# Patient Record
Sex: MALE | Race: WHITE | NOT HISPANIC OR LATINO | Employment: UNEMPLOYED | URBAN - METROPOLITAN AREA
[De-identification: names, ages, dates, MRNs, and addresses within clinical notes are randomized per-mention and may not be internally consistent; named-entity substitution may affect disease eponyms.]

---

## 2017-04-30 ENCOUNTER — APPOINTMENT (OUTPATIENT)
Dept: RADIOLOGY | Facility: MEDICAL CENTER | Age: 47
End: 2017-04-30
Attending: EMERGENCY MEDICINE

## 2017-04-30 ENCOUNTER — APPOINTMENT (OUTPATIENT)
Dept: RADIOLOGY | Facility: MEDICAL CENTER | Age: 47
End: 2017-04-30
Attending: EMERGENCY MEDICINE
Payer: MEDICAID

## 2017-04-30 ENCOUNTER — HOSPITAL ENCOUNTER (EMERGENCY)
Facility: MEDICAL CENTER | Age: 47
End: 2017-04-30
Attending: EMERGENCY MEDICINE
Payer: MEDICAID

## 2017-04-30 VITALS
TEMPERATURE: 97.9 F | RESPIRATION RATE: 16 BRPM | WEIGHT: 254.19 LBS | DIASTOLIC BLOOD PRESSURE: 97 MMHG | BODY MASS INDEX: 33.69 KG/M2 | SYSTOLIC BLOOD PRESSURE: 141 MMHG | OXYGEN SATURATION: 99 % | HEART RATE: 85 BPM | HEIGHT: 73 IN

## 2017-04-30 DIAGNOSIS — I15.9 SECONDARY HYPERTENSION: ICD-10-CM
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
## 2017-04-30 DIAGNOSIS — M54.2 NECK PAIN: ICD-10-CM

## 2017-04-30 DIAGNOSIS — S43.401A SPRAIN OF RIGHT SHOULDER, UNSPECIFIED SHOULDER SPRAIN TYPE, INITIAL ENCOUNTER: ICD-10-CM

## 2017-04-30 DIAGNOSIS — R73.9 HYPERGLYCEMIA: ICD-10-CM

## 2017-04-30 LAB — GLUCOSE BLD-MCNC: 133 MG/DL (ref 65–99)

## 2017-04-30 PROCEDURE — 71010 DX-CHEST-LIMITED (1 VIEW): CPT

## 2017-04-30 PROCEDURE — 72125 CT NECK SPINE W/O DYE: CPT

## 2017-04-30 PROCEDURE — 99284 EMERGENCY DEPT VISIT MOD MDM: CPT

## 2017-04-30 PROCEDURE — 82962 GLUCOSE BLOOD TEST: CPT

## 2017-04-30 PROCEDURE — 700102 HCHG RX REV CODE 250 W/ 637 OVERRIDE(OP): Performed by: EMERGENCY MEDICINE

## 2017-04-30 PROCEDURE — A9270 NON-COVERED ITEM OR SERVICE: HCPCS | Performed by: EMERGENCY MEDICINE

## 2017-04-30 PROCEDURE — 73030 X-RAY EXAM OF SHOULDER: CPT | Mod: RT

## 2017-04-30 RX ORDER — HYDROCODONE BITARTRATE AND ACETAMINOPHEN 5; 325 MG/1; MG/1
1-2 TABLET ORAL EVERY 4 HOURS PRN
Qty: 10 TAB | Refills: 0 | Status: SHIPPED | OUTPATIENT
Start: 2017-04-30 | End: 2018-12-27

## 2017-04-30 RX ORDER — CYCLOBENZAPRINE HCL 10 MG
10 TABLET ORAL 3 TIMES DAILY PRN
Qty: 10 TAB | Refills: 0 | Status: SHIPPED | OUTPATIENT
Start: 2017-04-30 | End: 2018-12-27

## 2017-04-30 RX ORDER — HYDROCODONE BITARTRATE AND ACETAMINOPHEN 5; 325 MG/1; MG/1
1 TABLET ORAL ONCE
Status: COMPLETED | OUTPATIENT
Start: 2017-04-30 | End: 2017-04-30

## 2017-04-30 RX ADMIN — HYDROCODONE BITARTRATE AND ACETAMINOPHEN 1 TABLET: 5; 325 TABLET ORAL at 09:28

## 2017-04-30 ASSESSMENT — PAIN SCALES - GENERAL
PAINLEVEL_OUTOF10: 9
PAINLEVEL_OUTOF10: 4

## 2017-04-30 NOTE — ED AVS SNAPSHOT
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 12/15/2001 Document Revised: 03/11/2013 Document Reviewed: 12/29/2012  [employer]® Patient Information ©2014 [employer].    You have been given medication which is a narcotic.  You should not drive anything and not drink any alchol or take any other medications without first discussing them with your physician.    Soft Tissue Injury of the Neck  A soft tissue injury of the neck may be either blunt or penetrating. A blunt injury does not break the skin. A penetrating injury breaks the skin, creating an open wound. Blunt injuries may happen in several ways. Most involve some type of direct blow to the neck. This can cause serious injury to the windpipe, voice box, cervical spine, or esophagus. In some cases, the injury to the soft tissue can also result in a break (fracture) of the cervical spine.   Soft tissue injuries of the neck require immediate medical care. Sometimes, you may not notice the signs of injury right away. You may feel fine at first, but the swelling may eventually close off your airway. This could result in a significant or life-threatening injury. This is rare, but it is important to keep in mind with any injury to the neck.   CAUSES   Causes of blunt injury may include:  · \"Clothesline\" injuries. This happens when someone is moving at high speed and runs into a " clothesline, outstretched arm, or similar object. This results in a direct injury to the front of the neck. If the airway is blocked, it can cause suffocation due to lack of oxygen (asphyxiation) or even instant death.  · High-energy trauma. This includes injuries from motor vehicle crashes, falling from a great height, or heavy objects falling onto the neck.  · Sports-related injuries. Injury to the windpipe and voice box can result from being struck by another player or being struck by an object, such as a baseball, hockey stick, or an outstretched arm.  · Strangulation. This type of injury may cause skin trauma, hoarseness of voice, or broken cartilage in the voice box or windpipe. It may also cause a serious airway problem.  SYMPTOMS   · Bruising.  · Pain and tenderness in the neck.  · Swelling of the neck and face.  · Hoarseness of voice.  · Pain or difficulty with swallowing.  · Drooling or inability to swallow.  · Trouble breathing. This may become worse when lying flat.  · Coughing up blood.  · High-pitched, harsh, vibratory noise due to partial obstruction of the windpipe (stridor).  · Swelling of the upper arms.  · Windpipe that appears to be pushed off to one side.  · Air in the tissues under the skin of the neck or chest (subcutaneous emphysema). This usually indicates a problem with the normal airway and is a medical emergency.  DIAGNOSIS   · If possible, your caregiver may ask about the details of how the injury occurred. A detailed exam can help to identify specific areas of the neck that are injured.  · Your caregiver may ask for tests to rule out injury of the voice box, airway, or esophagus. This may include X-rays, ultrasounds, CT scans, or MRI scans, depending on the severity of your injury.  TREATMENT   If you have an injury to your windpipe or voice box, immediate medical care is required. In almost all cases, hospitalization is necessary. For injuries that do not appear to require surgery, it  is helpful to have medical observation for 24 hours. You may be asked to do one or more of the following:  · Rest your voice.  · Bed rest.  · Limit your diet, depending on the extent of the injury. Follow your caregiver's dietary guidelines. Often, only fluids and soft foods are recommended.  · Keep your head raised.  · Breathe humidified air.  · Take medicines to control infection, reduce swelling, and reduce normal stomach acid. You may also need pain medicine, depending on your injury.  For injuries that appear to require surgery, you will need to stay in the hospital. The exact type of procedure needed will depend on your exact injury or injuries.   HOME CARE INSTRUCTIONS   · If the skin was broken, keep the wound area clean and dry. Wear your bandage (dressing) and care for your wound as instructed.  · Follow your caregiver's advice about your diet.  · Follow your caregiver's advice about use of your voice.  · Take medicines as directed.  · Keep your head and neck at least partially raised (elevated) while recovering. This should also be done while sleeping.  SEEK MEDICAL CARE IF:   · Your voice becomes weaker.  · Your swelling or bruising is not improving as expected. Typically, this takes several days to improve.  · You feel that you are having problems with medicines prescribed.  · You have drainage from the injury site. This may be a sign that your wound is not healing properly or is infected.  · You develop increasing pain or difficulty while swallowing.  · You develop an oral temperature of 102° F (38.9° C) or higher.  SEEK IMMEDIATE MEDICAL CARE IF:   · You cough up blood.  · You develop sudden trouble breathing.  · You cannot tolerate your oral medicines, or you are unable to swallow.  · You develop drooling.  · You have new or worsening vomiting.  · You develop sudden, new swelling of the neck or face.  · You have an oral temperature above 102° F (38.9° C), not controlled by medicine.  MAKE SURE  YOU:  · Understand these instructions.  · Will watch your condition.  · Will get help right away if you are not doing well or get worse.     This information is not intended to replace advice given to you by your health care provider. Make sure you discuss any questions you have with your health care provider.     Document Released: 03/26/2009 Document Revised: 03/11/2013 Document Reviewed: 03/05/2012  [employer] Patient Education ©2016 [employer]            Patient Information     Patient Information    Following emergency treatment: all patient requiring follow-up care must return either to a private physician or a clinic if your condition worsens before you are able to obtain further medical attention, please return to the emergency room.     Billing Information    At [facility], we work to make the billing process streamlined for our patients.  Our Representatives are here to answer any questions you may have regarding your hospital bill.  If you have insurance coverage and have supplied your insurance information to us, we will submit a claim to your insurer on your behalf.  Should you have any questions regarding your bill, we can be reached online or by phone as follows:  Online: You are able pay your bills online or live chat with our representatives about any billing questions you may have. We are here to help Monday - Friday from 8:00am to 7:30pm and 9:00am - 12:00pm on Saturdays.  Please visit [email or web address]  for more information.   Phone:  [phone] or [phone]    Please note that your emergency physician, surgeon, pathologist, radiologist, anesthesiologist, and other specialists are not employed by [facility] and will therefore bill separately for their services.  Please contact them directly for any questions concerning their bills at the numbers below:     Emergency Physician Services:  [phone]  [facility] [employer] [facility]:   [phone]  [facility]:  [phone]  [facility]:  [phone]    1. Your final bill may vary from the amount quoted upon discharge if all procedures are not complete at that time, or if your doctor has additional procedures of which we are not aware. You will receive an additional bill if you return to the Emergency Department at [facility] for suture removal regardless of the facility of which the sutures were placed.     2. Please arrange for settlement of this account at the emergency registration.    3. All self-pay accounts are due in full at the time of treatment.  If you are unable to meet this obligation then payment is expected within 4-5 days.     4. If you have had radiology studies (CT, X-ray, Ultrasound, MRI), you have received a preliminary result during your emergency department visit. Please contact the radiology department [phone] to receive a copy of your final result. Please discuss the Final result with your primary physician or with the follow up physician provided.     Crisis Hotline:  [place] Crisis Hotline:  5-496-BVUUUGQ or 1-139.538.5381  [place] Crisis Hotline:    1-899.793.7992 or 388-614-5743         ED Discharge Follow Up Questions    1. In order to provide you with very good care, we would like to follow up with a phone call in the next few days.  May we have your permission to contact you?     YES /  NO    2. What is the best phone number to call you? (       )_____-__________    3. What is the best time to call you?      Morning  /  Afternoon  /  Evening                   Patient Signature:  ____________________________________________________________    Date:  ____________________________________________________________
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
## 2017-04-30 NOTE — ED AVS SNAPSHOT
CopaCast Access Code: [email or web address]  Expires: 5/30/2017 10:03 AM    CopaCast  A secure, online tool to manage your health information     [employer]’s CopaCast® is a secure, online tool that connects you to your personalized health information from the privacy of your home -- day or night - making it very easy for you to manage your healthcare. Once the activation process is completed, you can even access your medical information using the CopaCast saroj, which is available for free in the Apple Saroj store or Google Play store.     CopaCast provides the following levels of access (as shown below):   My Chart Features   [facility] Primary Care Doctor [facility]  Specialists [facility]  Urgent  Care Non-[facility]  Primary Care  Doctor   Email your healthcare team securely and privately 24/7 X X X X   Manage appointments: schedule your next appointment; view details of past/upcoming appointments X      Request prescription refills. X      View recent personal medical records, including lab and immunizations X X X X   View health record, including health history, allergies, medications X X X X   Read reports about your outpatient visits, procedures, consult and ER notes X X X X   See your discharge summary, which is a recap of your hospital and/or ER visit that includes your diagnosis, lab results, and care plan. X X       How to register for CopaCast:  1. Go to  [email or web address].  2. Click on the Sign Up Now box, which takes you to the New Member Sign Up page. You will need to provide the following information:  a. Enter your CopaCast Access Code exactly as it appears at the top of this page. (You will not need to use this code after you’ve completed the sign-up process. If you do not sign up before the expiration date, you must request a new code.)   b. Enter your date of birth.   c. Enter your home email address.   d. Click Submit, and follow the next screen’s instructions.  3. Create a CopaCast ID. This will be your CopaCast  login ID and cannot be changed, so think of one that is secure and easy to remember.  4. Create a [employer] password. You can change your password at any time.  5. Enter your Password Reset Question and Answer. This can be used at a later time if you forget your password.   6. Enter your e-mail address. This allows you to receive e-mail notifications when new information is available in [employer].  7. Click Sign Up. You can now view your health information.    For assistance activating your [employer] account, call [phone]
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
## 2017-04-30 NOTE — ED AVS SNAPSHOT
4/30/2017    Anatoliy Paniagua  [address] [address]    Dear Anatoliy:    [facility] wants to ensure your discharge home is safe and you or your loved ones have had all of your questions answered regarding your care after you leave the hospital.    Below is a list of resources and contact information should you have any questions regarding your hospital stay, follow-up instructions, or active medical symptoms.    Questions or Concerns Regarding… Contact   Medical Questions Related to Your Discharge  (7 days a week, 8am-5pm) Contact a Nurse Care Coordinator   [phone]   Medical Questions Not Related to Your Discharge  (24 hours a day / 7 days a week)  Contact the Nurse Health Line   [phone]    Medications or Discharge Instructions Refer to your discharge packet   or contact your [facility] Primary Care Provider   [phone]   Follow-up Appointment(s) Schedule your appointment via Million-2-1   or contact Scheduling [phone]   Billing Review your statement via Million-[employer]-1  or contact Billing [phone]   Medical Records Review your records via Million-2-1   or contact Medical Records [phone]     You may receive a telephone call within two days of discharge. This call is to make certain you understand your discharge instructions and have the opportunity to have any questions answered. You can also easily access your medical information, test results and upcoming appointments via the Million-2-1 free online health management tool. You can learn more and sign up at [email or web address]. For assistance setting up your Million-2-1 account, please call [phone].    Once again, we want to ensure your discharge home is safe and that you have a clear understanding of any next steps in your care. If you have any questions or concerns, please do not hesitate to contact us, we are here for you. Thank you for choosing [facility] for your healthcare needs.    Sincerely,    Your [facility] Healthcare Team

## 2017-04-30 NOTE — DISCHARGE INSTRUCTIONS
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 the neck or chest (subcutaneous emphysema). This usually indicates a problem with the normal airway and is a medical emergency.  DIAGNOSIS   · If possible, your caregiver may ask about the details of how the injury occurred. A detailed exam can help to identify specific areas of the neck that are injured.  · Your caregiver may ask for tests to rule out injury of the voice box, airway, or esophagus. This may include X-rays, ultrasounds, CT scans, or MRI scans, depending on the severity of your injury.  TREATMENT   If you have an injury to your windpipe or voice box, immediate medical care is required. In almost all cases, hospitalization is necessary. For injuries that do not appear to require surgery, it is helpful to have medical observation for 24 hours. You may be asked to do one or more of the following:  · Rest your voice.  · Bed rest.  · Limit your diet, depending on the extent of the injury. Follow your caregiver's dietary guidelines. Often, only fluids and soft foods are recommended.  · Keep your head raised.  · Breathe humidified air.  · Take medicines to control infection, reduce swelling, and reduce normal stomach acid. You may also need pain medicine, depending on your injury.  For injuries that appear to require surgery, you will need to stay in the hospital. The exact type of procedure needed will depend on your exact injury or injuries.   HOME CARE INSTRUCTIONS   · If the skin was  broken, keep the wound area clean and dry. Wear your bandage (dressing) and care for your wound as instructed.  · Follow your caregiver's advice about your diet.  · Follow your caregiver's advice about use of your voice.  · Take medicines as directed.  · Keep your head and neck at least partially raised (elevated) while recovering. This should also be done while sleeping.  SEEK MEDICAL CARE IF:   · Your voice becomes weaker.  · Your swelling or bruising is not improving as expected. Typically, this takes several days to improve.  · You feel that you are having problems with medicines prescribed.  · You have drainage from the injury site. This may be a sign that your wound is not healing properly or is infected.  · You develop increasing pain or difficulty while swallowing.  · You develop an oral temperature of 102° F (38.9° C) or higher.  SEEK IMMEDIATE MEDICAL CARE IF:   · You cough up blood.  · You develop sudden trouble breathing.  · You cannot tolerate your oral medicines, or you are unable to swallow.  · You develop drooling.  · You have new or worsening vomiting.  · You develop sudden, new swelling of the neck or face.  · You have an oral temperature above 102° F (38.9° C), not controlled by medicine.  MAKE SURE YOU:  · Understand these instructions.  · Will watch your condition.  · Will get help right away if you are not doing well or get worse.     This information is not intended to replace advice given to you by your health care provider. Make sure you discuss any questions you have with your health care provider.     Document Released: 03/26/2009 Document Revised: 03/11/2013 Document Reviewed: 03/05/2012  [employer] Interactive Patient Education ©2016 [employer]
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
## 2017-05-02 ENCOUNTER — HOSPITAL ENCOUNTER (EMERGENCY)
Dept: HOSPITAL 8 - ED | Age: 47
LOS: 1 days | Discharge: HOME | End: 2017-05-03
Payer: MEDICAID

## 2017-05-02 VITALS — HEIGHT: 73 IN | WEIGHT: 225.53 LBS | BODY MASS INDEX: 29.89 KG/M2

## 2017-05-02 VITALS — SYSTOLIC BLOOD PRESSURE: 150 MMHG | DIASTOLIC BLOOD PRESSURE: 82 MMHG

## 2017-05-02 DIAGNOSIS — S06.0X0A: Primary | ICD-10-CM
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
## 2017-05-02 DIAGNOSIS — S16.1XXA: ICD-10-CM

## 2017-05-02 DIAGNOSIS — I10: ICD-10-CM
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
## 2017-05-02 PROCEDURE — 93005 ELECTROCARDIOGRAM TRACING: CPT

## 2017-05-02 PROCEDURE — 99284 EMERGENCY DEPT VISIT MOD MDM: CPT

## 2017-05-02 PROCEDURE — 70450 CT HEAD/BRAIN W/O DYE: CPT

## 2017-05-02 PROCEDURE — 72125 CT NECK SPINE W/O DYE: CPT

## 2018-01-28 ENCOUNTER — APPOINTMENT (OUTPATIENT)
Dept: RADIOLOGY | Facility: MEDICAL CENTER | Age: 48
End: 2018-01-28
Attending: EMERGENCY MEDICINE
Payer: COMMERCIAL

## 2018-01-28 ENCOUNTER — HOSPITAL ENCOUNTER (EMERGENCY)
Facility: MEDICAL CENTER | Age: 48
End: 2018-01-28
Attending: EMERGENCY MEDICINE
Payer: COMMERCIAL

## 2018-01-28 VITALS
WEIGHT: 246.91 LBS | OXYGEN SATURATION: 99 % | BODY MASS INDEX: 32.58 KG/M2 | HEART RATE: 74 BPM | DIASTOLIC BLOOD PRESSURE: 74 MMHG | SYSTOLIC BLOOD PRESSURE: 135 MMHG | TEMPERATURE: 97.7 F | RESPIRATION RATE: 18 BRPM

## 2018-01-28 DIAGNOSIS — M25.511 ACUTE PAIN OF RIGHT SHOULDER: ICD-10-CM

## 2018-01-28 LAB
ANION GAP SERPL CALC-SCNC: 10 MMOL/L (ref 0–11.9)
BASOPHILS # BLD AUTO: 0.6 % (ref 0–1.8)
BASOPHILS # BLD: 0.07 K/UL (ref 0–0.12)
BUN SERPL-MCNC: 16 MG/DL (ref 8–22)
CALCIUM SERPL-MCNC: 9.7 MG/DL (ref 8.5–10.5)
CHLORIDE SERPL-SCNC: 106 MMOL/L (ref 96–112)
CO2 SERPL-SCNC: 23 MMOL/L (ref 20–33)
CREAT SERPL-MCNC: 0.99 MG/DL (ref 0.5–1.4)
CRP SERPL HS-MCNC: 0.93 MG/DL (ref 0–0.75)
EOSINOPHIL # BLD AUTO: 0.15 K/UL (ref 0–0.51)
EOSINOPHIL NFR BLD: 1.2 % (ref 0–6.9)
ERYTHROCYTE [DISTWIDTH] IN BLOOD BY AUTOMATED COUNT: 41.1 FL (ref 35.9–50)
GLUCOSE SERPL-MCNC: 102 MG/DL (ref 65–99)
HCT VFR BLD AUTO: 43.2 % (ref 42–52)
HGB BLD-MCNC: 14.9 G/DL (ref 14–18)
IMM GRANULOCYTES # BLD AUTO: 0.04 K/UL (ref 0–0.11)
IMM GRANULOCYTES NFR BLD AUTO: 0.3 % (ref 0–0.9)
LYMPHOCYTES # BLD AUTO: 2.21 K/UL (ref 1–4.8)
LYMPHOCYTES NFR BLD: 18.3 % (ref 22–41)
MCH RBC QN AUTO: 31.7 PG (ref 27–33)
MCHC RBC AUTO-ENTMCNC: 34.5 G/DL (ref 33.7–35.3)
MCV RBC AUTO: 91.9 FL (ref 81.4–97.8)
MONOCYTES # BLD AUTO: 0.93 K/UL (ref 0–0.85)
MONOCYTES NFR BLD AUTO: 7.7 % (ref 0–13.4)
NEUTROPHILS # BLD AUTO: 8.69 K/UL (ref 1.82–7.42)
NEUTROPHILS NFR BLD: 71.9 % (ref 44–72)
NRBC # BLD AUTO: 0 K/UL
NRBC BLD-RTO: 0 /100 WBC
PLATELET # BLD AUTO: 285 K/UL (ref 164–446)
PMV BLD AUTO: 9.1 FL (ref 9–12.9)
POTASSIUM SERPL-SCNC: 3.9 MMOL/L (ref 3.6–5.5)
RBC # BLD AUTO: 4.7 M/UL (ref 4.7–6.1)
SODIUM SERPL-SCNC: 139 MMOL/L (ref 135–145)
WBC # BLD AUTO: 12.1 K/UL (ref 4.8–10.8)

## 2018-01-28 PROCEDURE — 99284 EMERGENCY DEPT VISIT MOD MDM: CPT

## 2018-01-28 PROCEDURE — 85025 COMPLETE CBC W/AUTO DIFF WBC: CPT

## 2018-01-28 PROCEDURE — 700111 HCHG RX REV CODE 636 W/ 250 OVERRIDE (IP): Performed by: EMERGENCY MEDICINE

## 2018-01-28 PROCEDURE — 80048 BASIC METABOLIC PNL TOTAL CA: CPT

## 2018-01-28 PROCEDURE — 86140 C-REACTIVE PROTEIN: CPT

## 2018-01-28 PROCEDURE — 73030 X-RAY EXAM OF SHOULDER: CPT | Mod: RT

## 2018-01-28 PROCEDURE — 93971 EXTREMITY STUDY: CPT

## 2018-01-28 PROCEDURE — 96374 THER/PROPH/DIAG INJ IV PUSH: CPT

## 2018-01-28 PROCEDURE — 700101 HCHG RX REV CODE 250: Performed by: EMERGENCY MEDICINE

## 2018-01-28 RX ORDER — LIDOCAINE HYDROCHLORIDE 10 MG/ML
10 INJECTION, SOLUTION INFILTRATION; PERINEURAL ONCE
Status: COMPLETED | OUTPATIENT
Start: 2018-01-28 | End: 2018-01-28

## 2018-01-28 RX ORDER — KETOROLAC TROMETHAMINE 30 MG/ML
INJECTION, SOLUTION INTRAMUSCULAR; INTRAVENOUS
Status: DISCONTINUED
Start: 2018-01-28 | End: 2018-01-28 | Stop reason: HOSPADM

## 2018-01-28 RX ORDER — KETOROLAC TROMETHAMINE 30 MG/ML
30 INJECTION, SOLUTION INTRAMUSCULAR; INTRAVENOUS ONCE
Status: COMPLETED | OUTPATIENT
Start: 2018-01-28 | End: 2018-01-28

## 2018-01-28 RX ORDER — NEBIVOLOL 20 MG/1
TABLET ORAL
COMMUNITY
End: 2018-12-27

## 2018-01-28 RX ADMIN — KETOROLAC TROMETHAMINE 30 MG: 30 INJECTION, SOLUTION INTRAMUSCULAR at 10:42

## 2018-01-28 RX ADMIN — LIDOCAINE HYDROCHLORIDE 10 ML: 10 INJECTION, SOLUTION INFILTRATION; PERINEURAL at 09:45

## 2018-01-28 ASSESSMENT — PAIN SCALES - GENERAL
PAINLEVEL_OUTOF10: 8
PAINLEVEL_OUTOF10: 8

## 2018-01-28 NOTE — ED PROVIDER NOTES
ED Provider Note    CHIEF COMPLAINT  Chief Complaint   Patient presents with   • Arm Pain     right       HPI  Anatoliy Paniagua is a 47 y.o. male presents with right shoulder pain. Started a few days ago. Gradually worsening. Worse when he moves the arm. He localizes pain over the right trapezius and anterior right shoulder region. Denies trauma. Denies repetitive activity. Has not had a fever or rash. Initially denied history of injection drug use, but did shoot methamphetamine a couple months ago.    Says he's had off and on lower extremity swelling as well. This is all improved at this time. He had some right hip pain a few days ago but no hip pain now. No back pain. He denies any weakness numbness neurologic symptoms.    REVIEW OF SYSTEMS  As per HPI, otherwise a 10 point review of systems is negative    PAST MEDICAL HISTORY  Past Medical History:   Diagnosis Date   • Hypertension    • Psychiatric disorder     mood   • Stroke (CMS-[facility])        SOCIAL HISTORY  Social History   Substance Use Topics   • Smoking status: Current Every Day Smoker     Packs/day: 0.50     Types: Cigarettes   • Smokeless tobacco: Never Used      Comment: 1/2ppd; 20years   • Alcohol use Yes      Comment: socially       SURGICAL HISTORY  History reviewed. No pertinent surgical history.    CURRENT MEDICATIONS  Home Medications    **Home medications have not yet been reviewed for this encounter**         ALLERGIES  No Known Allergies    PHYSICAL EXAM  VITAL SIGNS: /96   Pulse 75   Temp 36 °C (96.8 °F)   Resp 17   Wt 112 kg (246 lb 14.6 oz)   SpO2 99%   BMI 32.58 kg/m²    Constitutional: Awake and alert, anxious  HENT:  Atraumatic, few lipomas over the scalp.Oropharynx moist mucus membranes, Nose normal inspection.   Eyes: Normal inspection  Neck: Supple  Cardiovascular: Normal heart rate, Normal rhythm.  Symmetric peripheral pulses. No murmur  Thorax & Lungs: No respiratory distress, No wheezing, No rales, No rhonchi, No chest  tenderness.   Abdomen: Bowel sounds normal, soft, non-distended, nontender, no mass  Skin: Warm, Dry, No rash.   Back: No tenderness, No CVA tenderness.   Extremities: No clubbing, cyanosis, edema, no Homans or cords   Neurologic: Awake alert oriented can move all extremities      RADIOLOGY/PROCEDURES  LE VENOUS DUPLEX (Specify in Comments Left, Right Or Bilateral)   Preliminary result report no DVT       DX-SHOULDER 2+ RIGHT   Final Result      There is no evidence of acute fracture.   Mild osteoarthritic changes are noted at the AC joint.      UE VENOUS DUPLEX    (Results Pending)      Imaging is interpreted by radiologist        Arthrocentesis Procedure Note    Indication: Joint pain    Consent: The patient was counseled regarding the procedure, it's indications, risks, potential complications and alternatives and any questions were answered. Consent was obtained.    Procedure: The right shoulder was positioned appropriately and the landmarks were identified.  Local anesthesia was obtained by infiltration using 1% Lidocaine without epinephrine.  The area was then prepped and draped in the usual sterile fashion.  A needle was then introduced into the joint space at which point No fluid was able to be aspirated.  A joint injection was not performed.    The patient tolerated the procedure well.    Complications: None          Labs:  Results for orders placed or performed during the hospital encounter of 01/28/18   CBC WITH DIFFERENTIAL   Result Value Ref Range    WBC 12.1 (H) 4.8 - 10.8 K/uL    RBC 4.70 4.70 - 6.10 M/uL    Hemoglobin 14.9 14.0 - 18.0 g/dL    Hematocrit 43.2 42.0 - 52.0 %    MCV 91.9 81.4 - 97.8 fL    MCH 31.7 27.0 - 33.0 pg    MCHC 34.5 33.7 - 35.3 g/dL    RDW 41.1 35.9 - 50.0 fL    Platelet Count 285 164 - 446 K/uL    MPV 9.1 9.0 - 12.9 fL    Neutrophils-Polys 71.90 44.00 - 72.00 %    Lymphocytes 18.30 (L) 22.00 - 41.00 %    Monocytes 7.70 0.00 - 13.40 %    Eosinophils 1.20 0.00 - 6.90 %     Basophils 0.60 0.00 - 1.80 %    Immature Granulocytes 0.30 0.00 - 0.90 %    Nucleated RBC 0.00 /100 WBC    Neutrophils (Absolute) 8.69 (H) 1.82 - 7.42 K/uL    Lymphs (Absolute) 2.21 1.00 - 4.80 K/uL    Monos (Absolute) 0.93 (H) 0.00 - 0.85 K/uL    Eos (Absolute) 0.15 0.00 - 0.51 K/uL    Baso (Absolute) 0.07 0.00 - 0.12 K/uL    Immature Granulocytes (abs) 0.04 0.00 - 0.11 K/uL    NRBC (Absolute) 0.00 K/uL   BASIC METABOLIC PANEL   Result Value Ref Range    Sodium 139 135 - 145 mmol/L    Potassium 3.9 3.6 - 5.5 mmol/L    Chloride 106 96 - 112 mmol/L    Co2 23 20 - 33 mmol/L    Glucose 102 (H) 65 - 99 mg/dL    Bun 16 8 - 22 mg/dL    Creatinine 0.99 0.50 - 1.40 mg/dL    Calcium 9.7 8.5 - 10.5 mg/dL    Anion Gap 10.0 0.0 - 11.9   CRP QUANTITIVE (NON-CARDIAC)   Result Value Ref Range    Stat C-Reactive Protein 0.93 (H) 0.00 - 0.75 mg/dL   ESTIMATED GFR   Result Value Ref Range    GFR If African American >60 >60 mL/min/1.73 m 2    GFR If Non African American >60 >60 mL/min/1.73 m 2           COURSE & MEDICAL DECISION MAKING  Patient presents with atraumatic right shoulder pain. He has pain with range of motion of the right shoulder. No obvious effusion although is quite muscular. There may be slight asymmetry. He does not have a fever. No overlying skin rash. He does have a history of injection drug use. X-ray of the right shoulder was negative. I obtained laboratory data that demonstrated a very minimally elevated CRP and WBC count. I subsequently attempted joint arthrocentesis. Fairly confident that I entered the joint space and there was no fluid to be aspirated. The patient was given Toradol IV and felt improved. His vital signs were normal.    I consulted Dr. Aguilar. I discussed the presentation of atraumatic shoulder pain without obvious effusion in the face of injection drug use with minimally elevated WBC count and CRP. I cannot rule out intra-articular infection at this time, but does not appear there is a large  effusion. He stated the patient could follow-up closely for repeat evaluation and additional diagnostics as needed. The patient can be seen at the [place] [facility] tomorrow. This seems completely reasonable. I've advised NSAIDs for now. Advised rest and ice. He should return to the ER for any fevers, skin rash or concern.    FINAL IMPRESSION  1. Right shoulder arthralgia      This dictation was created using voice recognition software. The accuracy of the dictation is limited to the abilities of the software.  The nursing notes were reviewed and certain aspects of this information were incorporated into this note.      Electronically signed by: Jacobo Sotelo, 1/28/2018 7:51 AM

## 2018-01-28 NOTE — ED TRIAGE NOTES
"Chief Complaint   Patient presents with   • Arm Pain     right     Pt amb to triage with right arm pain x2 days. Pt reports \"things going down hill' past 3 months. Pt tired in triage. Blood pressure 141/96, pulse 75, temperature 36 °C (96.8 °F), resp. rate 17, weight 112 kg (246 lb 14.6 oz), SpO2 99 %.      "

## 2018-01-28 NOTE — DISCHARGE INSTRUCTIONS
Shoulder Pain  The shoulder is the joint that connects your arms to your body. The bones that form the shoulder joint include the upper arm bone (humerus), the shoulder blade (scapula), and the collarbone (clavicle). The top of the humerus is shaped like a ball and fits into a rather flat socket on the scapula (glenoid cavity). A combination of muscles and strong, fibrous tissues that connect muscles to bones (tendons) support your shoulder joint and hold the ball in the socket. Small, fluid-filled sacs (bursae) are located in different areas of the joint. They act as cushions between the bones and the overlying soft tissues and help reduce friction between the gliding tendons and the bone as you move your arm. Your shoulder joint allows a wide range of motion in your arm. This range of motion allows you to do things like scratch your back or throw a ball. However, this range of motion also makes your shoulder more prone to pain from overuse and injury.  Causes of shoulder pain can originate from both injury and overuse and usually can be grouped in the following four categories:  · Redness, swelling, and pain (inflammation) of the tendon (tendinitis) or the bursae (bursitis).  · Instability, such as a dislocation of the joint.  · Inflammation of the joint (arthritis).  · Broken bone (fracture).  HOME CARE INSTRUCTIONS   · Apply ice to the sore area.  ¨ Put ice in a plastic bag.  ¨ Place a towel between your skin and the bag.  ¨ Leave the ice on for 15-20 minutes, 3-4 times per day for the first 2 days, or as directed by your health care provider.  · Stop using cold packs if they do not help with the pain.  · If you have a shoulder sling or immobilizer, wear it as long as your caregiver instructs. Only remove it to shower or bathe. Move your arm as little as possible, but keep your hand moving to prevent swelling.  · Squeeze a soft ball or foam pad as much as possible to help prevent swelling.  · Only take  over-the-counter or prescription medicines for pain, discomfort, or fever as directed by your caregiver.  SEEK MEDICAL CARE IF:   · Your shoulder pain increases, or new pain develops in your arm, hand, or fingers.  · Your hand or fingers become cold and numb.  · Your pain is not relieved with medicines.  SEEK IMMEDIATE MEDICAL CARE IF:   · Your arm, hand, or fingers are numb or tingling.  · Your arm, hand, or fingers are significantly swollen or turn white or blue.  MAKE SURE YOU:   · Understand these instructions.  · Will watch your condition.  · Will get help right away if you are not doing well or get worse.     This information is not intended to replace advice given to you by your health care provider. Make sure you discuss any questions you have with your health care provider.     Document Released: 09/27/2006 Document Revised: 01/08/2016 Document Reviewed: 04/11/2016  [employer] Interactive Patient Education ©2016 [employer]

## 2018-04-15 ENCOUNTER — HOSPITAL ENCOUNTER (INPATIENT)
Dept: HOSPITAL 8 - ED | Age: 48
LOS: 2 days | Discharge: LEFT BEFORE DISCHARGE | DRG: 311 | End: 2018-04-17
Attending: HOSPITALIST | Admitting: HOSPITALIST
Payer: MEDICAID

## 2018-04-15 VITALS — SYSTOLIC BLOOD PRESSURE: 136 MMHG | DIASTOLIC BLOOD PRESSURE: 82 MMHG

## 2018-04-15 VITALS — DIASTOLIC BLOOD PRESSURE: 84 MMHG | SYSTOLIC BLOOD PRESSURE: 128 MMHG

## 2018-04-15 VITALS — WEIGHT: 253.53 LBS | HEIGHT: 73 IN | BODY MASS INDEX: 33.6 KG/M2

## 2018-04-15 VITALS — DIASTOLIC BLOOD PRESSURE: 82 MMHG | SYSTOLIC BLOOD PRESSURE: 126 MMHG

## 2018-04-15 VITALS — SYSTOLIC BLOOD PRESSURE: 112 MMHG | DIASTOLIC BLOOD PRESSURE: 75 MMHG

## 2018-04-15 VITALS — SYSTOLIC BLOOD PRESSURE: 169 MMHG | DIASTOLIC BLOOD PRESSURE: 90 MMHG

## 2018-04-15 DIAGNOSIS — Z82.49: ICD-10-CM

## 2018-04-15 DIAGNOSIS — T40.605A: ICD-10-CM

## 2018-04-15 DIAGNOSIS — I11.0: ICD-10-CM

## 2018-04-15 DIAGNOSIS — Z87.891: ICD-10-CM

## 2018-04-15 DIAGNOSIS — G93.40: ICD-10-CM

## 2018-04-15 DIAGNOSIS — Z79.82: ICD-10-CM

## 2018-04-15 DIAGNOSIS — Z53.21: ICD-10-CM
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
## 2018-04-15 DIAGNOSIS — T40.7X5A: ICD-10-CM

## 2018-04-15 DIAGNOSIS — F15.129: ICD-10-CM

## 2018-04-15 DIAGNOSIS — I50.30: ICD-10-CM

## 2018-04-15 DIAGNOSIS — E78.5: ICD-10-CM

## 2018-04-15 DIAGNOSIS — I20.9: Primary | ICD-10-CM

## 2018-04-15 LAB
ALBUMIN SERPL-MCNC: 3.9 G/DL (ref 3.4–5)
ANION GAP SERPL CALC-SCNC: 9 MMOL/L (ref 5–15)
BASOPHILS # BLD AUTO: 0.05 X10^3/UL (ref 0–0.1)
BASOPHILS # BLD AUTO: 0.05 X10^3/UL (ref 0–0.1)
BASOPHILS NFR BLD AUTO: 0 % (ref 0–1)
BASOPHILS NFR BLD AUTO: 1 % (ref 0–1)
CALCIUM SERPL-MCNC: 8.7 MG/DL (ref 8.5–10.1)
CHLORIDE SERPL-SCNC: 106 MMOL/L (ref 98–107)
CREAT SERPL-MCNC: 1.26 MG/DL (ref 0.7–1.3)
CULTURE INDICATED?: NO
EOSINOPHIL # BLD AUTO: 0.24 X10^3/UL (ref 0–0.4)
EOSINOPHIL # BLD AUTO: 0.32 X10^3/UL (ref 0–0.4)
EOSINOPHIL NFR BLD AUTO: 2 % (ref 1–7)
EOSINOPHIL NFR BLD AUTO: 3 % (ref 1–7)
ERYTHROCYTE [DISTWIDTH] IN BLOOD BY AUTOMATED COUNT: 12.9 % (ref 9.4–14.8)
ERYTHROCYTE [DISTWIDTH] IN BLOOD BY AUTOMATED COUNT: 13.2 % (ref 9.4–14.8)
LYMPHOCYTES # BLD AUTO: 2.33 X10^3/UL (ref 1–3.4)
LYMPHOCYTES # BLD AUTO: 2.64 X10^3/UL (ref 1–3.4)
LYMPHOCYTES NFR BLD AUTO: 17 % (ref 22–44)
LYMPHOCYTES NFR BLD AUTO: 26 % (ref 22–44)
MCH RBC QN AUTO: 30.2 PG (ref 27.5–34.5)
MCH RBC QN AUTO: 30.4 PG (ref 27.5–34.5)
MCHC RBC AUTO-ENTMCNC: 33.6 G/DL (ref 33.2–36.2)
MCHC RBC AUTO-ENTMCNC: 33.6 G/DL (ref 33.2–36.2)
MCV RBC AUTO: 89.8 FL (ref 81–97)
MCV RBC AUTO: 90.6 FL (ref 81–97)
MD: NO
MD: NO
MICROSCOPIC: (no result)
MONOCYTES # BLD AUTO: 0.73 X10^3/UL (ref 0.2–0.8)
MONOCYTES # BLD AUTO: 0.78 X10^3/UL (ref 0.2–0.8)
MONOCYTES NFR BLD AUTO: 6 % (ref 2–9)
MONOCYTES NFR BLD AUTO: 7 % (ref 2–9)
NEUTROPHILS # BLD AUTO: 10.14 X10^3/UL (ref 1.8–6.8)
NEUTROPHILS # BLD AUTO: 6.29 X10^3/UL (ref 1.8–6.8)
NEUTROPHILS NFR BLD AUTO: 63 % (ref 42–75)
NEUTROPHILS NFR BLD AUTO: 75 % (ref 42–75)
PLATELET # BLD AUTO: 255 X10^3/UL (ref 130–400)
PLATELET # BLD AUTO: 270 X10^3/UL (ref 130–400)
PMV BLD AUTO: 7.4 FL (ref 7.4–10.4)
PMV BLD AUTO: 7.5 FL (ref 7.4–10.4)
RBC # BLD AUTO: 5.35 X10^6/UL (ref 4.38–5.82)
RBC # BLD AUTO: 5.41 X10^6/UL (ref 4.38–5.82)
T4 FREE SERPL-MCNC: 0.98 NG/DL (ref 0.76–1.46)
TROPONIN I SERPL-MCNC: < 0.015 NG/ML (ref 0–0.04)
TROPONIN I SERPL-MCNC: < 0.015 NG/ML (ref 0–0.04)
TSH SERPL-ACNC: 2.52 MIU/L (ref 0.36–3.74)

## 2018-04-15 PROCEDURE — 83735 ASSAY OF MAGNESIUM: CPT

## 2018-04-15 PROCEDURE — A9502 TC99M TETROFOSMIN: HCPCS

## 2018-04-15 PROCEDURE — 84439 ASSAY OF FREE THYROXINE: CPT

## 2018-04-15 PROCEDURE — C9898 INPNT STAY RADIOLABELED ITEM: HCPCS

## 2018-04-15 PROCEDURE — 99285 EMERGENCY DEPT VISIT HI MDM: CPT

## 2018-04-15 PROCEDURE — 93306 TTE W/DOPPLER COMPLETE: CPT

## 2018-04-15 PROCEDURE — 82040 ASSAY OF SERUM ALBUMIN: CPT

## 2018-04-15 PROCEDURE — 78452 HT MUSCLE IMAGE SPECT MULT: CPT

## 2018-04-15 PROCEDURE — 93017 CV STRESS TEST TRACING ONLY: CPT

## 2018-04-15 PROCEDURE — 71045 X-RAY EXAM CHEST 1 VIEW: CPT

## 2018-04-15 PROCEDURE — 80307 DRUG TEST PRSMV CHEM ANLYZR: CPT

## 2018-04-15 PROCEDURE — 84100 ASSAY OF PHOSPHORUS: CPT

## 2018-04-15 PROCEDURE — 71275 CT ANGIOGRAPHY CHEST: CPT

## 2018-04-15 PROCEDURE — 80048 BASIC METABOLIC PNL TOTAL CA: CPT

## 2018-04-15 PROCEDURE — 80053 COMPREHEN METABOLIC PANEL: CPT

## 2018-04-15 PROCEDURE — 93005 ELECTROCARDIOGRAM TRACING: CPT

## 2018-04-15 PROCEDURE — 84484 ASSAY OF TROPONIN QUANT: CPT

## 2018-04-15 PROCEDURE — 84443 ASSAY THYROID STIM HORMONE: CPT

## 2018-04-15 PROCEDURE — 93880 EXTRACRANIAL BILAT STUDY: CPT

## 2018-04-15 PROCEDURE — 81003 URINALYSIS AUTO W/O SCOPE: CPT

## 2018-04-15 PROCEDURE — 80061 LIPID PANEL: CPT

## 2018-04-15 PROCEDURE — 36415 COLL VENOUS BLD VENIPUNCTURE: CPT

## 2018-04-15 PROCEDURE — 82962 GLUCOSE BLOOD TEST: CPT

## 2018-04-15 PROCEDURE — 85025 COMPLETE CBC W/AUTO DIFF WBC: CPT

## 2018-04-15 RX ADMIN — ENOXAPARIN SODIUM SCH MG: 40 INJECTION SUBCUTANEOUS at 12:41

## 2018-04-15 RX ADMIN — ASPIRIN SCH MG: 81 TABLET, COATED ORAL at 10:00

## 2018-04-15 RX ADMIN — PANTOPRAZOLE SODIUM SCH MG: 40 TABLET, DELAYED RELEASE ORAL at 12:40

## 2018-04-15 RX ADMIN — LISINOPRIL SCH MG: 20 TABLET ORAL at 12:41

## 2018-04-16 VITALS — DIASTOLIC BLOOD PRESSURE: 94 MMHG | SYSTOLIC BLOOD PRESSURE: 147 MMHG

## 2018-04-16 VITALS — DIASTOLIC BLOOD PRESSURE: 83 MMHG | SYSTOLIC BLOOD PRESSURE: 125 MMHG

## 2018-04-16 VITALS — DIASTOLIC BLOOD PRESSURE: 94 MMHG | SYSTOLIC BLOOD PRESSURE: 167 MMHG

## 2018-04-16 VITALS — SYSTOLIC BLOOD PRESSURE: 142 MMHG | DIASTOLIC BLOOD PRESSURE: 95 MMHG

## 2018-04-16 VITALS — DIASTOLIC BLOOD PRESSURE: 93 MMHG | SYSTOLIC BLOOD PRESSURE: 138 MMHG

## 2018-04-16 VITALS — DIASTOLIC BLOOD PRESSURE: 84 MMHG | SYSTOLIC BLOOD PRESSURE: 121 MMHG

## 2018-04-16 LAB
ALBUMIN SERPL-MCNC: 3.4 G/DL (ref 3.4–5)
ALP SERPL-CCNC: 75 U/L (ref 45–117)
ALT SERPL-CCNC: 59 U/L (ref 12–78)
ANION GAP SERPL CALC-SCNC: 6 MMOL/L (ref 5–15)
BILIRUB SERPL-MCNC: 0.2 MG/DL (ref 0.2–1)
CALCIUM SERPL-MCNC: 8.5 MG/DL (ref 8.5–10.1)
CHLORIDE SERPL-SCNC: 107 MMOL/L (ref 98–107)
CHOL/HDL RATIO: 6.7
CREAT SERPL-MCNC: 1.3 MG/DL (ref 0.7–1.3)
HDL CHOL %: 15 % (ref 26–37)
HDL CHOLESTEROL (DIRECT): 32 MG/DL (ref 40–60)
LDL CHOLESTEROL,CALCULATED: 134 MG/DL (ref 54–169)
LDLC/HDLC SERPL: 4.2 {RATIO} (ref 0.5–3)
PROT SERPL-MCNC: 7.4 G/DL (ref 6.4–8.2)
TRIGL SERPL-MCNC: 233 MG/DL (ref 50–200)
TROPONIN I SERPL-MCNC: < 0.015 NG/ML (ref 0–0.04)
VLDLC SERPL CALC-MCNC: 47 MG/DL (ref 0–25)

## 2018-04-16 RX ADMIN — ASPIRIN SCH MG: 81 TABLET, COATED ORAL at 08:26

## 2018-04-16 RX ADMIN — NICOTINE POLACRILEX PRN MG: 2 GUM, CHEWING ORAL at 08:30

## 2018-04-16 RX ADMIN — PANTOPRAZOLE SODIUM SCH MG: 40 TABLET, DELAYED RELEASE ORAL at 08:26

## 2018-04-16 RX ADMIN — LISINOPRIL SCH MG: 20 TABLET ORAL at 08:26

## 2018-04-16 RX ADMIN — ENOXAPARIN SODIUM SCH MG: 40 INJECTION SUBCUTANEOUS at 08:26

## 2018-04-17 VITALS — SYSTOLIC BLOOD PRESSURE: 145 MMHG | DIASTOLIC BLOOD PRESSURE: 85 MMHG

## 2018-04-17 VITALS — DIASTOLIC BLOOD PRESSURE: 83 MMHG | SYSTOLIC BLOOD PRESSURE: 128 MMHG

## 2018-04-17 VITALS — SYSTOLIC BLOOD PRESSURE: 131 MMHG | DIASTOLIC BLOOD PRESSURE: 81 MMHG

## 2018-04-17 VITALS — SYSTOLIC BLOOD PRESSURE: 162 MMHG | DIASTOLIC BLOOD PRESSURE: 92 MMHG

## 2018-04-17 VITALS — DIASTOLIC BLOOD PRESSURE: 105 MMHG | SYSTOLIC BLOOD PRESSURE: 172 MMHG

## 2018-04-17 VITALS — SYSTOLIC BLOOD PRESSURE: 122 MMHG | DIASTOLIC BLOOD PRESSURE: 85 MMHG

## 2018-04-17 VITALS — SYSTOLIC BLOOD PRESSURE: 170 MMHG | DIASTOLIC BLOOD PRESSURE: 103 MMHG

## 2018-04-17 VITALS — SYSTOLIC BLOOD PRESSURE: 150 MMHG | DIASTOLIC BLOOD PRESSURE: 70 MMHG

## 2018-04-17 VITALS — DIASTOLIC BLOOD PRESSURE: 68 MMHG | SYSTOLIC BLOOD PRESSURE: 135 MMHG

## 2018-04-17 VITALS — DIASTOLIC BLOOD PRESSURE: 83 MMHG | SYSTOLIC BLOOD PRESSURE: 122 MMHG

## 2018-04-17 RX ADMIN — DILTIAZEM HYDROCHLORIDE SCH MG: 60 TABLET, FILM COATED ORAL at 15:37

## 2018-04-17 RX ADMIN — ASPIRIN SCH MG: 81 TABLET, COATED ORAL at 07:52

## 2018-04-17 RX ADMIN — DILTIAZEM HYDROCHLORIDE SCH MG: 60 TABLET, FILM COATED ORAL at 10:36

## 2018-04-17 RX ADMIN — NICOTINE POLACRILEX PRN MG: 2 GUM, CHEWING ORAL at 10:39

## 2018-04-17 RX ADMIN — PANTOPRAZOLE SODIUM SCH MG: 40 TABLET, DELAYED RELEASE ORAL at 07:52

## 2018-04-17 RX ADMIN — ENOXAPARIN SODIUM SCH MG: 40 INJECTION SUBCUTANEOUS at 07:52

## 2018-04-17 RX ADMIN — LISINOPRIL SCH MG: 20 TABLET ORAL at 07:52

## 2018-12-27 ENCOUNTER — PATIENT OUTREACH (OUTPATIENT)
Dept: HEALTH INFORMATION MANAGEMENT | Facility: OTHER | Age: 48
End: 2018-12-27

## 2018-12-27 ENCOUNTER — HOSPITAL ENCOUNTER (EMERGENCY)
Facility: MEDICAL CENTER | Age: 48
End: 2018-12-27
Attending: EMERGENCY MEDICINE
Payer: COMMERCIAL

## 2018-12-27 VITALS
BODY MASS INDEX: 34.39 KG/M2 | SYSTOLIC BLOOD PRESSURE: 155 MMHG | TEMPERATURE: 97.5 F | OXYGEN SATURATION: 97 % | DIASTOLIC BLOOD PRESSURE: 106 MMHG | HEIGHT: 73 IN | HEART RATE: 68 BPM | RESPIRATION RATE: 20 BRPM | WEIGHT: 259.48 LBS

## 2018-12-27 DIAGNOSIS — G89.29 CHRONIC NONINTRACTABLE HEADACHE, UNSPECIFIED HEADACHE TYPE: ICD-10-CM

## 2018-12-27 DIAGNOSIS — I10 HYPERTENSION, UNSPECIFIED TYPE: ICD-10-CM

## 2018-12-27 DIAGNOSIS — R51.9 CHRONIC NONINTRACTABLE HEADACHE, UNSPECIFIED HEADACHE TYPE: ICD-10-CM

## 2018-12-27 LAB
ANION GAP SERPL CALC-SCNC: 10 MMOL/L (ref 0–11.9)
BASOPHILS # BLD AUTO: 0.9 % (ref 0–1.8)
BASOPHILS # BLD: 0.08 K/UL (ref 0–0.12)
BUN SERPL-MCNC: 21 MG/DL (ref 8–22)
CALCIUM SERPL-MCNC: 10.1 MG/DL (ref 8.5–10.5)
CHLORIDE SERPL-SCNC: 105 MMOL/L (ref 96–112)
CO2 SERPL-SCNC: 24 MMOL/L (ref 20–33)
CREAT SERPL-MCNC: 1.13 MG/DL (ref 0.5–1.4)
EKG IMPRESSION: NORMAL
EOSINOPHIL # BLD AUTO: 0.22 K/UL (ref 0–0.51)
EOSINOPHIL NFR BLD: 2.4 % (ref 0–6.9)
ERYTHROCYTE [DISTWIDTH] IN BLOOD BY AUTOMATED COUNT: 38.5 FL (ref 35.9–50)
GLUCOSE SERPL-MCNC: 96 MG/DL (ref 65–99)
HCT VFR BLD AUTO: 49.3 % (ref 42–52)
HGB BLD-MCNC: 16.8 G/DL (ref 14–18)
IMM GRANULOCYTES # BLD AUTO: 0.04 K/UL (ref 0–0.11)
IMM GRANULOCYTES NFR BLD AUTO: 0.4 % (ref 0–0.9)
LYMPHOCYTES # BLD AUTO: 2.47 K/UL (ref 1–4.8)
LYMPHOCYTES NFR BLD: 26.7 % (ref 22–41)
MCH RBC QN AUTO: 30.7 PG (ref 27–33)
MCHC RBC AUTO-ENTMCNC: 34.1 G/DL (ref 33.7–35.3)
MCV RBC AUTO: 90 FL (ref 81.4–97.8)
MONOCYTES # BLD AUTO: 0.54 K/UL (ref 0–0.85)
MONOCYTES NFR BLD AUTO: 5.8 % (ref 0–13.4)
NEUTROPHILS # BLD AUTO: 5.91 K/UL (ref 1.82–7.42)
NEUTROPHILS NFR BLD: 63.8 % (ref 44–72)
NRBC # BLD AUTO: 0 K/UL
NRBC BLD-RTO: 0 /100 WBC
PLATELET # BLD AUTO: 290 K/UL (ref 164–446)
PMV BLD AUTO: 9 FL (ref 9–12.9)
POTASSIUM SERPL-SCNC: 3.9 MMOL/L (ref 3.6–5.5)
RBC # BLD AUTO: 5.48 M/UL (ref 4.7–6.1)
SODIUM SERPL-SCNC: 139 MMOL/L (ref 135–145)
WBC # BLD AUTO: 9.3 K/UL (ref 4.8–10.8)

## 2018-12-27 PROCEDURE — 96374 THER/PROPH/DIAG INJ IV PUSH: CPT

## 2018-12-27 PROCEDURE — 96375 TX/PRO/DX INJ NEW DRUG ADDON: CPT

## 2018-12-27 PROCEDURE — 85025 COMPLETE CBC W/AUTO DIFF WBC: CPT

## 2018-12-27 PROCEDURE — 93005 ELECTROCARDIOGRAM TRACING: CPT | Performed by: EMERGENCY MEDICINE

## 2018-12-27 PROCEDURE — 99284 EMERGENCY DEPT VISIT MOD MDM: CPT

## 2018-12-27 PROCEDURE — 80048 BASIC METABOLIC PNL TOTAL CA: CPT

## 2018-12-27 PROCEDURE — 700111 HCHG RX REV CODE 636 W/ 250 OVERRIDE (IP): Performed by: EMERGENCY MEDICINE

## 2018-12-27 PROCEDURE — A9270 NON-COVERED ITEM OR SERVICE: HCPCS | Performed by: EMERGENCY MEDICINE

## 2018-12-27 PROCEDURE — 700102 HCHG RX REV CODE 250 W/ 637 OVERRIDE(OP): Performed by: EMERGENCY MEDICINE

## 2018-12-27 PROCEDURE — 700105 HCHG RX REV CODE 258: Performed by: EMERGENCY MEDICINE

## 2018-12-27 RX ORDER — SODIUM CHLORIDE 9 MG/ML
1000 INJECTION, SOLUTION INTRAVENOUS ONCE
Status: COMPLETED | OUTPATIENT
Start: 2018-12-27 | End: 2018-12-27

## 2018-12-27 RX ORDER — KETOROLAC TROMETHAMINE 30 MG/ML
30 INJECTION, SOLUTION INTRAMUSCULAR; INTRAVENOUS ONCE
Status: COMPLETED | OUTPATIENT
Start: 2018-12-27 | End: 2018-12-27

## 2018-12-27 RX ORDER — LISINOPRIL 10 MG/1
5 TABLET ORAL ONCE
Status: COMPLETED | OUTPATIENT
Start: 2018-12-27 | End: 2018-12-27

## 2018-12-27 RX ORDER — LISINOPRIL 5 MG/1
5 TABLET ORAL DAILY
Qty: 30 TAB | Refills: 0 | Status: SHIPPED | OUTPATIENT
Start: 2018-12-27 | End: 2019-01-17

## 2018-12-27 RX ORDER — DIPHENHYDRAMINE HYDROCHLORIDE 50 MG/ML
50 INJECTION INTRAMUSCULAR; INTRAVENOUS ONCE
Status: COMPLETED | OUTPATIENT
Start: 2018-12-27 | End: 2018-12-27

## 2018-12-27 RX ADMIN — KETOROLAC TROMETHAMINE 30 MG: 30 INJECTION, SOLUTION INTRAMUSCULAR at 15:11

## 2018-12-27 RX ADMIN — PROCHLORPERAZINE EDISYLATE 10 MG: 5 INJECTION INTRAMUSCULAR; INTRAVENOUS at 15:05

## 2018-12-27 RX ADMIN — SODIUM CHLORIDE 1000 ML: 9 INJECTION, SOLUTION INTRAVENOUS at 15:05

## 2018-12-27 RX ADMIN — LISINOPRIL 5 MG: 10 TABLET ORAL at 14:56

## 2018-12-27 RX ADMIN — DIPHENHYDRAMINE HYDROCHLORIDE 50 MG: 50 INJECTION INTRAMUSCULAR; INTRAVENOUS at 15:11

## 2018-12-27 NOTE — ED TRIAGE NOTES
Pt to triage .  Chief Complaint   Patient presents with   • Headache     hst of the same r/t htn but has not been placed on medication

## 2018-12-27 NOTE — ED PROVIDER NOTES
"ED PROVIDER NOTE    Scribed for Zaira Pond M.D. by Tawny Parry. 12/27/2018  2:23 PM    Means of arrival: walk-in  History obtained from: patient  History limited by: none    CHIEF COMPLAINT  Chief Complaint   Patient presents with   • Headache     hst of the same r/t htn but has not been placed on medication        HPI  Anatoliy Paniagua is a 48 y.o. male with past medical hx of hypertension, diabetes and hyperlipidemia who presents today for evaluation of a non radiating headache onset a few days ago, located along the back of his head with associated intermittent nausea(last episode being 2 days ago). This is exacerbated with any sort of movement of the head or light, gradually worsening over the last few days. Patient reports that he woke up today, with his headache being the worse it has been, prompting him to come to the ED. SO accompanying him today, reports that she has been noticing some intermittent facial swelling, describing his face as \"sometimes weird in appearance.\"   Patient reports a history of hypertension, however, is not currently on maintenance medication. He has been on Lisinopril in the past, however, is not currently, discontinuing this once he was released from detention.  He has been evaluated for headaches similar to this in the past, reportedly always being told that this was secondary to his hypertension. This headache is not resolved with Aleve, last dose earlier this morning. Patient has significant family history of hypertension, heart attacks and strokes at a young age. There is family history of diabetes, and patient was found to be borderline diabetic a few years ago with admission, however, he reports he checks his blood sugars intermittently, frequently running in the 90s.  No complaints of vomiting.   Patient is also reporting difficulties along his left eye, that has been present for the last few years. He states that he feels his eyelid half shutting or twitching " "intermittently, sometimes requiring him to open his eye with his fingers to see. These symptoms occur more frequently at night and he also has poor baseline sleeping habits. There is family history of myasthenia gravis in his uncle.    REVIEW OF SYSTEMS  See HPI for further details. All other systems are negative.     PAST MEDICAL HISTORY   has a past medical history of Hypertension; Psychiatric disorder; and Stroke (HCC).    SOCIAL HISTORY  Social History     Social History Main Topics   • Smoking status: Current Every Day Smoker     Packs/day: 0.50     Types: Cigarettes   • Smokeless tobacco: Never Used      Comment: 1/2ppd; 20years   • Alcohol use Yes      Comment: socially   • Drug use: Yes     Types: Inhaled      Comment: I smoke weed       SURGICAL HISTORY  patient denies any surgical history    CURRENT MEDICATIONS  Home Medications     Reviewed by Betty Santizo R.N. (Registered Nurse) on 12/27/18 at 1323  Med List Status: Complete   Medication Last Dose Status        Patient Luciano Taking any Medications                       ALLERGIES  No Known Allergies    PHYSICAL EXAM  VITAL SIGNS: BP (!) 161/110 Comment: Pt states he has High BP, unable to get meds.  Pulse 85   Temp 36.4 °C (97.5 °F) (Temporal)   Resp 20   Ht 1.854 m (6' 1\")   Wt 117.7 kg (259 lb 7.7 oz)   SpO2 96%   BMI 34.23 kg/m²     Pulse ox interpretation: I interpret this pulse ox as normal.  Constitutional: Alert in no apparent distress  HENT: Normocephalic, atraumatic. Bilateral external ears normal, Nose normal. Moist mucous membranes.  Eyes: Pupils are equal and reactive, Conjunctiva normal. EOMI, no nystagmus  Neck: Normal range of motion, Supple, non-tender. No JVD  Lymphatic: No lymphadenopathy noted.   Cardiovascular: normal rate and rhythm, no murmurs. Distal pulses intact.  no peripheral edema.  Thorax & Lungs: normal breath sounds.  no wheezing/rales/rhonchi. no increased work of breathing, clipped speech or retractions.  Abdomen: " Soft, non-distended, non tender to palpation, no CVA tenderness  Skin: Warm, Dry, No erythema, no rash. Mild chronic venous insufficiency changes noted along skin of BLE  Musculoskeletal: Good range of motion in all major joints. No major deformities noted.  Neurologic:  Alert, EOMI, no nystagmus, fluent speech, tongue midline, no facial droop, 5/5 strength BUE and BLE. negative romberg, normal gait, no pronator drift, heal to shin intact, normal finger to nose, Grossly intact neurologic exam.   Psychiatric: Affect normal, Judgment normal, Mood normal.       DIAGNOSTIC STUDIES / PROCEDURES        LABS and EKG  Results for orders placed or performed during the hospital encounter of 12/27/18   CBC WITH DIFFERENTIAL   Result Value Ref Range    WBC 9.3 4.8 - 10.8 K/uL    RBC 5.48 4.70 - 6.10 M/uL    Hemoglobin 16.8 14.0 - 18.0 g/dL    Hematocrit 49.3 42.0 - 52.0 %    MCV 90.0 81.4 - 97.8 fL    MCH 30.7 27.0 - 33.0 pg    MCHC 34.1 33.7 - 35.3 g/dL    RDW 38.5 35.9 - 50.0 fL    Platelet Count 290 164 - 446 K/uL    MPV 9.0 9.0 - 12.9 fL    Neutrophils-Polys 63.80 44.00 - 72.00 %    Lymphocytes 26.70 22.00 - 41.00 %    Monocytes 5.80 0.00 - 13.40 %    Eosinophils 2.40 0.00 - 6.90 %    Basophils 0.90 0.00 - 1.80 %    Immature Granulocytes 0.40 0.00 - 0.90 %    Nucleated RBC 0.00 /100 WBC    Neutrophils (Absolute) 5.91 1.82 - 7.42 K/uL    Lymphs (Absolute) 2.47 1.00 - 4.80 K/uL    Monos (Absolute) 0.54 0.00 - 0.85 K/uL    Eos (Absolute) 0.22 0.00 - 0.51 K/uL    Baso (Absolute) 0.08 0.00 - 0.12 K/uL    Immature Granulocytes (abs) 0.04 0.00 - 0.11 K/uL    NRBC (Absolute) 0.00 K/uL   BASIC METABOLIC PANEL   Result Value Ref Range    Sodium 139 135 - 145 mmol/L    Potassium 3.9 3.6 - 5.5 mmol/L    Chloride 105 96 - 112 mmol/L    Co2 24 20 - 33 mmol/L    Glucose 96 65 - 99 mg/dL    Bun 21 8 - 22 mg/dL    Creatinine 1.13 0.50 - 1.40 mg/dL    Calcium 10.1 8.5 - 10.5 mg/dL    Anion Gap 10.0 0.0 - 11.9   ESTIMATED GFR   Result Value  Ref Range    GFR If African American >60 >60 mL/min/1.73 m 2    GFR If Non African American >60 >60 mL/min/1.73 m 2   EKG (NOW)   Result Value Ref Range    Report       [facility] Emergency Dept.    Test Date:  2018  Pt Name:    MOHSEN OWENS                Department: ER  MRN:        [id number]                      Room:       [facility]  Gender:     Male                         Technician: [id number]  :        1970                   Requested By:ROMAIN REYES  Order #:    [id number]                    Reading MD:    Measurements  Intervals                                Axis  Rate:       75                           P:          10  NV:         204                          QRS:        -91  QRSD:       122                          T:          37  QT:         416  QTc:        465    Interpretive Statements  SINUS RHYTHM  BORDERLINE AV CONDUCTION DELAY  RBBB AND LAFB  Compared to ECG 2012 18:09:15  Incomplete right bundle-branch block no longer present  Myocardial infarct finding no longer present  Accelerated junctional rhythm no longer present           COURSE & MEDICAL DECISION MAKING  Nursing notes and vital signs were reviewed. (See chart for details)  The patients records were reviewed showing hx of hypertension, borderline diabetes, hyperlipidemia, history was obtained from the patient.    2:23 PM - Patient seen and examined at bedside. He presents hypertensive, not currently on medications for this, for evaluation of worsening headache, onset over the last few days. Discussed plan of care, including managing his symptoms here in the ED with medications and evaluating for acute processes with lab work and EKG. Patient agrees to the plan of care. The patient will be resuscitated with 1L NS IV for headache and medicated with 30mg IV Toradol, 10mg OV Compazine, 50mg I Benadryl, and 5mg Lisinopril tablet. Ordered for CBC, BMP, and EKG to evaluate his symptoms. Patient's neurological  exam is normal at this time, however, he does have history of borderline medical problems including diabetes and hypertension, warranting a complete work up. Differential diagnoses include but not limited to: hypertensive emergency vs urgency, end stage kidney failure, CVA, TIA, IPH, mass, .    4:24 PM Patient's lab work so far is negative. SO was informed of this, as patient was sleeping. Will re-evaluate the headache once medications are completed.    5:00 PM Feels better. Improved BP. Labs neg acute with no evidence of end organ damage. EKG without ischemic changes. Normal mental status. No e/o hypertensive urgency or emergency at this time.     Have low clinical suspicion for acute neurologic process given normal neuro exam. Patient concerned about myasthenia gravis, but given sxs only left eye, unchanged x 2 years, and worse in the morning and improving over course of day: not consistent with myasthenia.    Given improved symptoms with migraine cocktail, improved BP with lisinopril, Rx for new lisinopril dose, to f/u with MD. Called scheduling at [facility], cannot be seen with his [employer] insurance. Patient and girlfriend in room informed that he needs to switch to medicaid or to f/u in [place].     Patient discharged in company of girlfriend in good condition, Rx lisinopril, strict return precautions.    FINAL IMPRESSION  (I10) Hypertension, unspecified type  (R51) Chronic nonintractable headache, unspecified headache type     I, Tawny Parry (Scribe), am scribing for, and in the presence of, Zaira Pond M.D..    Electronically signed by: Tawny Parry (Farhadibe), 12/27/2018    IZaira M.D. personally performed the services described in this documentation, as scribed by Tawny Parry in my presence, and it is both accurate and complete.    The note accurately reflects work and decisions made by me.  Zaira Pond  12/27/2018  5:20 PM    This dictation was created using voice recognition  software. The accuracy of the dictation is limited to the abilities of the software. I expect there may be some errors of grammar and possibly content. The nursing notes were reviewed and certain aspects of this information were incorporated into this note.

## 2019-01-17 ENCOUNTER — HOSPITAL ENCOUNTER (EMERGENCY)
Facility: MEDICAL CENTER | Age: 49
End: 2019-01-17
Attending: EMERGENCY MEDICINE
Payer: COMMERCIAL

## 2019-01-17 VITALS
BODY MASS INDEX: 32.08 KG/M2 | HEART RATE: 80 BPM | OXYGEN SATURATION: 95 % | WEIGHT: 250 LBS | SYSTOLIC BLOOD PRESSURE: 193 MMHG | TEMPERATURE: 98.4 F | HEIGHT: 74 IN | DIASTOLIC BLOOD PRESSURE: 105 MMHG | RESPIRATION RATE: 18 BRPM

## 2019-01-17 DIAGNOSIS — I10 HYPERTENSION, UNSPECIFIED TYPE: ICD-10-CM

## 2019-01-17 DIAGNOSIS — Z00.8 MEDICAL CLEARANCE FOR INCARCERATION: ICD-10-CM

## 2019-01-17 PROCEDURE — 99284 EMERGENCY DEPT VISIT MOD MDM: CPT

## 2019-01-17 PROCEDURE — A9270 NON-COVERED ITEM OR SERVICE: HCPCS | Performed by: EMERGENCY MEDICINE

## 2019-01-17 PROCEDURE — 700102 HCHG RX REV CODE 250 W/ 637 OVERRIDE(OP): Performed by: EMERGENCY MEDICINE

## 2019-01-17 RX ORDER — LISINOPRIL 10 MG/1
5 TABLET ORAL ONCE
Status: COMPLETED | OUTPATIENT
Start: 2019-01-17 | End: 2019-01-17

## 2019-01-17 RX ORDER — LISINOPRIL 20 MG/1
20 TABLET ORAL DAILY
Qty: 30 TAB | Refills: 0 | Status: SHIPPED | OUTPATIENT
Start: 2019-01-17 | End: 2019-02-16

## 2019-01-17 RX ORDER — LABETALOL 100 MG/1
100 TABLET, FILM COATED ORAL ONCE
Status: DISCONTINUED | OUTPATIENT
Start: 2019-01-17 | End: 2019-01-17

## 2019-01-17 RX ORDER — LABETALOL 100 MG/1
100 TABLET, FILM COATED ORAL ONCE
Status: COMPLETED | OUTPATIENT
Start: 2019-01-17 | End: 2019-01-17

## 2019-01-17 RX ADMIN — LABETALOL HYDROCHLORIDE 100 MG: 100 TABLET, FILM COATED ORAL at 00:47

## 2019-01-17 RX ADMIN — LISINOPRIL 5 MG: 10 TABLET ORAL at 00:47

## 2019-01-17 ASSESSMENT — PAIN SCALES - GENERAL: PAINLEVEL_OUTOF10: 5

## 2019-01-17 NOTE — ED PROVIDER NOTES
ED Provider Note    CHIEF COMPLAINT  Chief Complaint   Patient presents with   • Medical Clearance     in custody, noncomplaint with blood pressure medication, sent for medical clearance due to hypertension   • Hypertension     noncompliant with home lisinopril, pt asymptomatic of HTN       HPI  Anatoliy Paniagua is a 48 y.o. male who presents to the emergency department for chief complaint of hypertension.  He is here is for med rinse for alf.  The patient states he supposed be taking lisinopril but is extremely noncompliant with his medications and cannot give me a good reason not for taking them.  He denies any chest pain or shortness of breath any dizziness any nausea or vomiting vision changes any weakness numbness or tingling.  He states earlier his vision was a little blurry but currently it is not.  He does endorse using amphetamines within the last 24 hours.    REVIEW OF SYSTEMS  Positives as above. Pertinent negatives include chest pain shortness of breath leg swelling nausea vomiting weakness numbness tingling speech difficulty  All other review of systems are negative    PAST MEDICAL HISTORY   has a past medical history of Hypertension; Psychiatric disorder; and Stroke (HCC).    SOCIAL HISTORY  Social History     Social History Main Topics   • Smoking status: Current Every Day Smoker     Packs/day: 0.50     Types: Cigarettes   • Smokeless tobacco: Never Used      Comment: 1/2ppd; 20years   • Alcohol use Yes      Comment: socially   • Drug use: Yes     Types: Inhaled      Comment: I smoke weed   • Sexual activity: Not on file       SURGICAL HISTORY  patient denies any surgical history    CURRENT MEDICATIONS  Home Medications     Reviewed by Oneyda Hennessy R.N. (Registered Nurse) on 01/17/19 at 0022  Med List Status: Partial   Medication Last Dose Status   lisinopril (PRINIVIL) 5 MG Tab not taking Active                ALLERGIES  No Known Allergies    PHYSICAL EXAM  VITAL SIGNS: BP (!) 185/113   Pulse  "78   Temp 36.9 °C (98.4 °F) (Temporal)   Resp 18   Ht 1.88 m (6' 2\")   Wt 113.4 kg (250 lb)   SpO2 96%   BMI 32.10 kg/m²    Pulse ox interpretation: I interpret this pulse ox as normal.  Constitutional: Alert in no apparent distress.  HENT: Normocephalic, Atraumatic, MMM  Eyes: PERound. Conjunctiva normal, non-icteric.   Heart: Regular rate and rythm, no murmurs.    Lungs: Clear to auscultation bilaterally. No resp distress, breath sounds equal  Abdomen: Non-tender, non-distended, normal bowel sounds  EXT: no edema, normal peripheral perfusion  Skin: Warm, Dry, No erythema, No rash.   Neurologic: Alert and oriented, Grossly non-focal.       DIFFERENTIAL DIAGNOSIS AND WORK UP PLAN    This is a 48 y.o. male who presents with hypertension amphetamine abuse and medication noncompliance.  The patient is asymptomatic since prior to transfer back to the long term system medically cleared but at this time does not indicate a cardiac nor renal workup.      Pertinent Lab Findings  Labs Reviewed - No data to display    Radiology  No orders to display     The radiologist's interpretation of all radiological studies have been reviewed by me.    COURSE & MEDICAL DECISION MAKING  Pertinent Labs & Imaging studies reviewed. (See chart for details)    1:39 AM  The patient is resting comfortably - pending repeat BP assessment and then transfer    2:09 AM  The patient is resting comfortably and will be discharged to long term with a new prescription for lisinopril and return to the ED for any new or worsening problems     /116  Pulse 80   Temp 36.9 °C (98.4 °F) (Temporal)   Resp 18   Ht 1.88 m (6' 2\")   Wt 113.4 kg (250 lb)   SpO2 95%   BMI 32.10 kg/m²         The patient will return for new or worsening symptoms and is stable at the time of discharge.    The patient is referred to a primary physician for blood pressure management, diabetic screening, and for all other preventative health concerns.    DISPOSITION:  Patient " will be discharged home in stable condition.    FOLLOW UP:  [facility], Emergency Dept  [address] [facility] [id number] [phone]  Schedule an appointment as soon as possible for a visit       [address] [address]  [phone]  Schedule an appointment as soon as possible for a visit   for blood pressure recheck      OUTPATIENT MEDICATIONS:  Current Discharge Medication List      lisinopril 20mg daily         FINAL IMPRESSION  1. Hypertension, unspecified type    2. Medical clearance for incarceration                 Electronically signed by: Malka Lemons, 1/17/2019 12:25 AM    This dictation has been created using voice recognition software and/or scribes. The accuracy of the dictation is limited by the abilities of the software and the expertise of the scribes. I expect there may be some errors of grammar and possibly content. I made every attempt to manually correct the errors within my dictation. However, errors related to voice recognition software and/or scribes may still exist and should be interpreted within the appropriate context.

## 2019-01-17 NOTE — ED NOTES
OK to d/c per MD Cristo, pt endorses feeling improves, continues to deny s/s HTN at this time. Educated r/t prescription, pt d/c'd back into custody and has prescription with police.

## 2019-01-17 NOTE — ED TRIAGE NOTES
Chief Complaint   Patient presents with   • Medical Clearance     in custody, noncomplaint with blood pressure medication, sent for medical clearance due to hypertension   • Hypertension     noncompliant with home lisinopril, pt asymptomatic of HTN     Brought in from [facility]'s office for medical clearance. Hypertensive, asymptomatic and non compliant with lisinopril. retirement requiring medical clearance at this time, pt states he is having some shoulder pain. Denies dizziness, headache, chest pain, shortness of breath. Remains hypertensive. Axo4, PERRLA

## 2025-02-13 ENCOUNTER — APPOINTMENT (OUTPATIENT)
Dept: RADIOLOGY | Facility: MEDICAL CENTER | Age: 55
End: 2025-02-13
Attending: EMERGENCY MEDICINE

## 2025-02-13 ENCOUNTER — HOSPITAL ENCOUNTER (EMERGENCY)
Facility: MEDICAL CENTER | Age: 55
End: 2025-02-13
Attending: EMERGENCY MEDICINE
Payer: COMMERCIAL

## 2025-02-13 VITALS
HEIGHT: 74 IN | OXYGEN SATURATION: 93 % | TEMPERATURE: 96 F | HEART RATE: 69 BPM | WEIGHT: 250 LBS | DIASTOLIC BLOOD PRESSURE: 74 MMHG | BODY MASS INDEX: 32.08 KG/M2 | SYSTOLIC BLOOD PRESSURE: 141 MMHG | RESPIRATION RATE: 18 BRPM

## 2025-02-13 DIAGNOSIS — R10.32 LLQ PAIN: ICD-10-CM

## 2025-02-13 DIAGNOSIS — K59.00 CONSTIPATION, UNSPECIFIED CONSTIPATION TYPE: ICD-10-CM

## 2025-02-13 LAB
ALBUMIN SERPL BCP-MCNC: 4.3 G/DL (ref 3.2–4.9)
ALBUMIN/GLOB SERPL: 1.3 G/DL
ALP SERPL-CCNC: 77 U/L (ref 30–99)
ALT SERPL-CCNC: 20 U/L (ref 2–50)
ANION GAP SERPL CALC-SCNC: 12 MMOL/L (ref 7–16)
AST SERPL-CCNC: 24 U/L (ref 12–45)
BASOPHILS # BLD AUTO: 0.7 % (ref 0–1.8)
BASOPHILS # BLD: 0.06 K/UL (ref 0–0.12)
BILIRUB SERPL-MCNC: 0.5 MG/DL (ref 0.1–1.5)
BUN SERPL-MCNC: 14 MG/DL (ref 8–22)
CALCIUM ALBUM COR SERPL-MCNC: 9.2 MG/DL (ref 8.5–10.5)
CALCIUM SERPL-MCNC: 9.4 MG/DL (ref 8.5–10.5)
CHLORIDE SERPL-SCNC: 107 MMOL/L (ref 96–112)
CO2 SERPL-SCNC: 20 MMOL/L (ref 20–33)
CREAT SERPL-MCNC: 1.16 MG/DL (ref 0.5–1.4)
EOSINOPHIL # BLD AUTO: 0.23 K/UL (ref 0–0.51)
EOSINOPHIL NFR BLD: 2.6 % (ref 0–6.9)
ERYTHROCYTE [DISTWIDTH] IN BLOOD BY AUTOMATED COUNT: 42 FL (ref 35.9–50)
GFR SERPLBLD CREATININE-BSD FMLA CKD-EPI: 74 ML/MIN/1.73 M 2
GLOBULIN SER CALC-MCNC: 3.4 G/DL (ref 1.9–3.5)
GLUCOSE SERPL-MCNC: 103 MG/DL (ref 65–99)
HCT VFR BLD AUTO: 42.7 % (ref 42–52)
HGB BLD-MCNC: 14.5 G/DL (ref 14–18)
IMM GRANULOCYTES # BLD AUTO: 0.02 K/UL (ref 0–0.11)
IMM GRANULOCYTES NFR BLD AUTO: 0.2 % (ref 0–0.9)
LIPASE SERPL-CCNC: 33 U/L (ref 11–82)
LYMPHOCYTES # BLD AUTO: 1.88 K/UL (ref 1–4.8)
LYMPHOCYTES NFR BLD: 21.1 % (ref 22–41)
MCH RBC QN AUTO: 31.2 PG (ref 27–33)
MCHC RBC AUTO-ENTMCNC: 34 G/DL (ref 32.3–36.5)
MCV RBC AUTO: 91.8 FL (ref 81.4–97.8)
MONOCYTES # BLD AUTO: 0.6 K/UL (ref 0–0.85)
MONOCYTES NFR BLD AUTO: 6.7 % (ref 0–13.4)
NEUTROPHILS # BLD AUTO: 6.14 K/UL (ref 1.82–7.42)
NEUTROPHILS NFR BLD: 68.7 % (ref 44–72)
NRBC # BLD AUTO: 0 K/UL
NRBC BLD-RTO: 0 /100 WBC (ref 0–0.2)
PLATELET # BLD AUTO: 343 K/UL (ref 164–446)
PMV BLD AUTO: 9.1 FL (ref 9–12.9)
POTASSIUM SERPL-SCNC: 3.9 MMOL/L (ref 3.6–5.5)
PROT SERPL-MCNC: 7.7 G/DL (ref 6–8.2)
RBC # BLD AUTO: 4.65 M/UL (ref 4.7–6.1)
SODIUM SERPL-SCNC: 139 MMOL/L (ref 135–145)
WBC # BLD AUTO: 8.9 K/UL (ref 4.8–10.8)

## 2025-02-13 PROCEDURE — 36415 COLL VENOUS BLD VENIPUNCTURE: CPT

## 2025-02-13 PROCEDURE — 74177 CT ABD & PELVIS W/CONTRAST: CPT

## 2025-02-13 PROCEDURE — 80053 COMPREHEN METABOLIC PANEL: CPT

## 2025-02-13 PROCEDURE — 83690 ASSAY OF LIPASE: CPT

## 2025-02-13 PROCEDURE — 85025 COMPLETE CBC W/AUTO DIFF WBC: CPT

## 2025-02-13 PROCEDURE — 700117 HCHG RX CONTRAST REV CODE 255: Performed by: EMERGENCY MEDICINE

## 2025-02-13 PROCEDURE — 99284 EMERGENCY DEPT VISIT MOD MDM: CPT

## 2025-02-13 RX ORDER — DOCUSATE SODIUM 100 MG/1
100 CAPSULE, LIQUID FILLED ORAL 2 TIMES DAILY
Qty: 28 CAPSULE | Refills: 0 | Status: SHIPPED | OUTPATIENT
Start: 2025-02-13 | End: 2025-02-27

## 2025-02-13 RX ADMIN — IOHEXOL 100 ML: 350 INJECTION, SOLUTION INTRAVENOUS at 14:57

## 2025-02-13 ASSESSMENT — PAIN DESCRIPTION - PAIN TYPE: TYPE: ACUTE PAIN

## 2025-02-13 NOTE — ED TRIAGE NOTES
"Chief Complaint   Patient presents with    LLQ Pain     Pt reports sharp pains to LLQ and has not had BM in 4 days.  +flatulence       Pt to triage with steady gait for above complaint. Presents with LLQ pain, pt denies BM for 4 days, -n/v. Reports similar instance years ago and was given \"some liquid to drink\" and it worked    Pt back to lobby, educated on triage process and encourage to alert staff of any changes.     Vitals:    02/13/25 1222   BP: (!) 133/91   Pulse: 95   Resp: 16   Temp: (!) 35.6 °C (96 °F)   SpO2: 96%           "

## 2025-02-13 NOTE — ED NOTES
Written and verbal instructions provided to pt. Pt instructed to  medications from pharmacy. Pt instructed to return to emergency department for new or worsening symptoms. Pt verbalized understanding of discharge instructions. Pt ambulatory upon discharge with all belongings.

## 2025-02-13 NOTE — ED PROVIDER NOTES
"ED Provider Note    CHIEF COMPLAINT  Chief Complaint   Patient presents with    LLQ Pain     Pt reports sharp pains to LLQ and has not had BM in 4 days.  +flatulence       EXTERNAL RECORDS REVIEWED  Inpatient Notes patient was admitted at [facility] in April 2018 for angina.  History of hypertension, unspecified diastolic heart failure, and hypertension.    HPI/ROS  LIMITATION TO HISTORY   Select: : None  OUTSIDE HISTORIAN(S):  None    Anatoliy Paniagua is a 54 y.o. male who presents to the ER with complaint of left lower quadrant abdominal pain which occurred 4 days ago.  Since then patient has not had a bowel movement.  He says he has an urge to defecate but when he sits down on the toilet to try to have a bowel movement, he is unable to do so.  He said that he has had some intermittent blood in his stool.  He talked to his primary care physician about that a few months ago and was scheduled to see GI last month, but his Medicaid lapsed and his appointment was canceled.  Patient had a colonoscopy at the age of 42 and said there was some polyps and an ulcer somewhere.  He was not sure if he was supposed to follow-up with a repeat colonoscopy at any point.  Patient said his bowel movements were pretty regular up until the time in which she developed the pain several days ago.  He said he had similar pain and constipation issues when he was in custodial a few years ago.  They gave him some sort of laxative which seem to help.  However, at that time he had quite a bit of abdominal discomfort and \"was really sick with it.\"  He has not had fevers or chills.  No nausea or vomiting.  No back pain.  No testicle pain or swelling.  No pain with urination.  No blood in urine.  No history of kidney stones.    PAST MEDICAL HISTORY   has a past medical history of Hypertension, Psychiatric disorder, and Stroke (HCC).    SURGICAL HISTORY  patient denies any surgical history    FAMILY HISTORY  History reviewed. No pertinent family " "history.    SOCIAL HISTORY  Social History     Tobacco Use    Smoking status: Every Day     Current packs/day: 0.25     Types: Cigarettes    Smokeless tobacco: Never    Tobacco comments:     1/2ppd; 20years   Vaping Use    Vaping status: Never Used   Substance and Sexual Activity    Alcohol use: Not Currently     Comment: socially    Drug use: Yes     Types: Inhaled     Comment: I smoke weed    Sexual activity: Not on file       CURRENT MEDICATIONS  Home Medications       Reviewed by Eve Vera R.N. (Registered Nurse) on 02/13/25 at 1239  Med List Status: Not Addressed     Medication Last Dose Status        Patient Luciano Taking any Medications                           ALLERGIES  No Known Allergies    PHYSICAL EXAM  VITAL SIGNS: BP (!) 141/74   Pulse 69   Temp (!) 35.6 °C (96 °F) (Temporal)   Resp 18   Ht 1.88 m (6' 2\")   Wt 113 kg (250 lb)   SpO2 93%   BMI 32.10 kg/m²    Constitutional:  Well developed, well nourished; No acute distress   HENT: Normocephalic, Atraumatic, Bilateral external ears normal, Oropharynx moist, No erythema or exudates in posterior oropharynx.   Eyes: PERRL, EOMI, Conjunctiva normal, No discharge.   Neck: Normal range of motion, supple, nontender  Lymphatic: No lymphadenopathy noted.   Cardiovascular: Normal heart rate, Normal rhythm, No murmurs, rubs or gallops   Thorax & Lungs: CTA=bilaterally;  No respiratory distress,  No wheezing rales, or rhonchi; No chest tenderness. No crepitus or subQ air  Abdomen: soft, good bowel sounds, no guarding no rebound, no masses, no pulsatile mass, mild inconsistent right upper quadrant and right lower quadrant tenderness, no distention  Skin: Warm, Dry, No erythema, No rash.   Back: No tenderness, No CVA tenderness.   Extremities: 2+ dp and pt pulses bilateral LEs;  Nontender; no pretibial edema  Neurologic: Alert & oriented x 4, clear speech,   Psychiatric: appropriate, normal affect     EKG/LABS  Results for orders placed or performed " during the hospital encounter of 02/13/25   CBC WITH DIFFERENTIAL    Collection Time: 02/13/25 12:44 PM   Result Value Ref Range    WBC 8.9 4.8 - 10.8 K/uL    RBC 4.65 (L) 4.70 - 6.10 M/uL    Hemoglobin 14.5 14.0 - 18.0 g/dL    Hematocrit 42.7 42.0 - 52.0 %    MCV 91.8 81.4 - 97.8 fL    MCH 31.2 27.0 - 33.0 pg    MCHC 34.0 32.3 - 36.5 g/dL    RDW 42.0 35.9 - 50.0 fL    Platelet Count 343 164 - 446 K/uL    MPV 9.1 9.0 - 12.9 fL    Neutrophils-Polys 68.70 44.00 - 72.00 %    Lymphocytes 21.10 (L) 22.00 - 41.00 %    Monocytes 6.70 0.00 - 13.40 %    Eosinophils 2.60 0.00 - 6.90 %    Basophils 0.70 0.00 - 1.80 %    Immature Granulocytes 0.20 0.00 - 0.90 %    Nucleated RBC 0.00 0.00 - 0.20 /100 WBC    Neutrophils (Absolute) 6.14 1.82 - 7.42 K/uL    Lymphs (Absolute) 1.88 1.00 - 4.80 K/uL    Monos (Absolute) 0.60 0.00 - 0.85 K/uL    Eos (Absolute) 0.23 0.00 - 0.51 K/uL    Baso (Absolute) 0.06 0.00 - 0.12 K/uL    Immature Granulocytes (abs) 0.02 0.00 - 0.11 K/uL    NRBC (Absolute) 0.00 K/uL   COMP METABOLIC PANEL    Collection Time: 02/13/25 12:44 PM   Result Value Ref Range    Sodium 139 135 - 145 mmol/L    Potassium 3.9 3.6 - 5.5 mmol/L    Chloride 107 96 - 112 mmol/L    Co2 20 20 - 33 mmol/L    Anion Gap 12.0 7.0 - 16.0    Glucose 103 (H) 65 - 99 mg/dL    Bun 14 8 - 22 mg/dL    Creatinine 1.16 0.50 - 1.40 mg/dL    Calcium 9.4 8.5 - 10.5 mg/dL    Correct Calcium 9.2 8.5 - 10.5 mg/dL    AST(SGOT) 24 12 - 45 U/L    ALT(SGPT) 20 2 - 50 U/L    Alkaline Phosphatase 77 30 - 99 U/L    Total Bilirubin 0.5 0.1 - 1.5 mg/dL    Albumin 4.3 3.2 - 4.9 g/dL    Total Protein 7.7 6.0 - 8.2 g/dL    Globulin 3.4 1.9 - 3.5 g/dL    A-G Ratio 1.3 g/dL   LIPASE    Collection Time: 02/13/25 12:44 PM   Result Value Ref Range    Lipase 33 11 - 82 U/L   ESTIMATED GFR    Collection Time: 02/13/25 12:44 PM   Result Value Ref Range    GFR (CKD-EPI) 74 >60 mL/min/1.73 m 2           RADIOLOGY/PROCEDURES   I have independently interpreted the diagnostic  imaging associated with this visit and am waiting the final reading from the radiologist.     My preliminary interpretation is as follows: ER MD is reviewed the patient's CT scan.  No obvious obstruction.    Radiologist interpretation:  CT-ABDOMEN-PELVIS WITH   Final Result         1. There is no evidence for bowel obstruction, diverticulitis, or appendicitis.   2. No evidence for obstructive uropathy.   3. Small left renal lesion is indeterminate and could be further assessed with ultrasound.   4. Atherosclerosis including coronary artery disease.          COURSE & MEDICAL DECISION MAKING    ASSESSMENT, COURSE AND PLAN  Care Narrative: Patient presents to the ER with complaint of left lower quadrant abdominal pain which occurred 4 days ago.  Pain resolved and he has been pain-free over the last 3 days.  However, he has not had a bowel movement in the last 4 days.  He also has an urge to defecate but is unable to do so.  Patient was supposed to see GI in January but he let his Medicaid lapsed and they canceled his appointment.  He reports having some intermittent blood in his stool over the last 4 months which is why he was supposed to see GI.  His last colonoscopy was at the age of 42.  He describes an urge to defecate but nothing seems to come out.  He is concerned he is constipated.  CT scan does not reveal any significant constipation.  Importantly, there is no evidence of bowel obstruction, diverticulitis, colitis, appendicitis, or any other dangerous intra-abdominal pathology.  He has not had any urinary symptoms.  No testicular pain.  No hematuria.  No evidence for obstructive uropathy.  The patient has no tenderness on abdominal examination.  He is well-appearing.  His vital signs are stable.  At this time I think patient needs to get back in with GI.  Given his tenesmus and the fact that he has had a little bit of intermittent blood in his stool for the last 4 months and was supposed to see GI for  colonoscopy last month, I have imparted to him the importance of getting back in to get his colonoscopy done.  He is not anemic.  He does not describe any blood in his stool today.  At this time he is safe for discharge home.  He has been given strict return precautions and discharge instructions and he understands treatment plan and follow-up.         Patient was told that he has a small indeterminate lesion on his left kidney that needs to be followed up.  He understands the importance of talking to his primary care physician about this incidental finding so that his PMD can order an outpatient ultrasound to further evaluate.    ADDITIONAL PROBLEMS MANAGED  Problem #1: Left lower quadrant abdominal pain 4 days ago which is since resolved.  Problem #2: Constipation x 4 days  Problem #3: Tenesmus    DISPOSITION AND DISCUSSIONS  I have discussed management of the patient with the following physicians and SYD's: None    Discussion of management with other QHP or appropriate source(s): None     Escalation of care considered, and ultimately not performed:IV fluids.  No vomiting.  Patient is not hypotensive or tachycardic.  He is not septic or toxic.  No need for IV fluids.    Barriers to care at this time, including but not limited to:  Patient needs to get reestablished with GI as he forgot to rehab his Medicaid and lost his GI appointment in January. .     Decision tools and prescription drugs considered including, but not limited to: Antibiotics no evidence of diverticulitis or colitis on CT scan.  No need for antibiotics. .    FINAL DIAGNOSIS  1. LLQ pain Acute   2. Constipation, unspecified constipation type Acute        This dictation has been created using voice recognition software. The accuracy of the dictation is limited by the abilities of the software. I expect there may be some errors of grammar and possibly content. I made every attempt to manually correct the errors within my dictation. However, errors  related to voice recognition software may still exist and should be interpreted within the appropriate context.     Electronically signed by: Sia Ann M.D., 2/13/2025 1:38 PM

## 2025-02-13 NOTE — ED NOTES
Rounded on pt. Pt resting comfortably in gurney, unlabored breathing, and states that they have minimal pain, rating it as a 1-2 in the LLQ.

## 2025-02-13 NOTE — DISCHARGE INSTRUCTIONS
Return to the ER for recurrent abdominal pain, worsening constipation, blood in stool, nausea, vomiting, fevers over 100.4, shaking chills, pain with urination, cloudy or foul-smelling urine, blood in urine, or for any worsening.    Please reschedule your colonoscopy as soon as possible.    Follow-up with your primary care physician early this coming week.  If you do not have a primary care doctor, please follow-up with the [facility] clinic early this coming week.    Drink plenty of water to stay well-hydrated.    The radiologist identified a nonspecific lesion on one of your kidneys on CAT scan.  This will need to be followed up by your primary care physician.  Please be sure that you tell your primary care physician about this lesion so that they can order a follow-up ultrasound as recommended by the radiologist.

## 2025-07-09 ENCOUNTER — APPOINTMENT (OUTPATIENT)
Dept: RADIOLOGY | Facility: MEDICAL CENTER | Age: 55
End: 2025-07-09
Attending: EMERGENCY MEDICINE

## 2025-07-09 ENCOUNTER — HOSPITAL ENCOUNTER (EMERGENCY)
Facility: MEDICAL CENTER | Age: 55
End: 2025-07-09
Attending: EMERGENCY MEDICINE

## 2025-07-09 VITALS
BODY MASS INDEX: 31.7 KG/M2 | DIASTOLIC BLOOD PRESSURE: 107 MMHG | RESPIRATION RATE: 18 BRPM | OXYGEN SATURATION: 96 % | HEIGHT: 73 IN | HEART RATE: 71 BPM | TEMPERATURE: 97 F | SYSTOLIC BLOOD PRESSURE: 198 MMHG | WEIGHT: 239.2 LBS

## 2025-07-09 DIAGNOSIS — S06.0X0A CONCUSSION WITHOUT LOSS OF CONSCIOUSNESS, INITIAL ENCOUNTER: ICD-10-CM

## 2025-07-09 DIAGNOSIS — S09.90XA CLOSED HEAD INJURY, INITIAL ENCOUNTER: ICD-10-CM

## 2025-07-09 DIAGNOSIS — M75.102 ROTATOR CUFF SYNDROME OF LEFT SHOULDER: Primary | ICD-10-CM

## 2025-07-09 DIAGNOSIS — I10 ESSENTIAL HYPERTENSION, MALIGNANT: ICD-10-CM

## 2025-07-09 LAB
ALBUMIN SERPL BCP-MCNC: 4.7 G/DL (ref 3.2–4.9)
ALBUMIN/GLOB SERPL: 1.3 G/DL
ALP SERPL-CCNC: 97 U/L (ref 30–99)
ALT SERPL-CCNC: 29 U/L (ref 2–50)
ANION GAP SERPL CALC-SCNC: 13 MMOL/L (ref 7–16)
APTT PPP: 33.4 SEC (ref 24.7–36)
AST SERPL-CCNC: 28 U/L (ref 12–45)
BASOPHILS # BLD AUTO: 0.8 % (ref 0–1.8)
BASOPHILS # BLD: 0.08 K/UL (ref 0–0.12)
BILIRUB SERPL-MCNC: 0.3 MG/DL (ref 0.1–1.5)
BUN SERPL-MCNC: 15 MG/DL (ref 8–22)
CALCIUM ALBUM COR SERPL-MCNC: 9.4 MG/DL (ref 8.5–10.5)
CALCIUM SERPL-MCNC: 10 MG/DL (ref 8.5–10.5)
CHLORIDE SERPL-SCNC: 101 MMOL/L (ref 96–112)
CO2 SERPL-SCNC: 24 MMOL/L (ref 20–33)
CREAT SERPL-MCNC: 1.07 MG/DL (ref 0.5–1.4)
EKG IMPRESSION: NORMAL
EOSINOPHIL # BLD AUTO: 0.24 K/UL (ref 0–0.51)
EOSINOPHIL NFR BLD: 2.4 % (ref 0–6.9)
ERYTHROCYTE [DISTWIDTH] IN BLOOD BY AUTOMATED COUNT: 41.9 FL (ref 35.9–50)
GFR SERPLBLD CREATININE-BSD FMLA CKD-EPI: 81 ML/MIN/1.73 M 2
GLOBULIN SER CALC-MCNC: 3.7 G/DL (ref 1.9–3.5)
GLUCOSE SERPL-MCNC: 103 MG/DL (ref 65–99)
HCT VFR BLD AUTO: 49.7 % (ref 42–52)
HGB BLD-MCNC: 16.5 G/DL (ref 14–18)
IMM GRANULOCYTES # BLD AUTO: 0.06 K/UL (ref 0–0.11)
IMM GRANULOCYTES NFR BLD AUTO: 0.6 % (ref 0–0.9)
INR PPP: 0.87 (ref 0.87–1.13)
LYMPHOCYTES # BLD AUTO: 1.93 K/UL (ref 1–4.8)
LYMPHOCYTES NFR BLD: 19.2 % (ref 22–41)
MCH RBC QN AUTO: 30.4 PG (ref 27–33)
MCHC RBC AUTO-ENTMCNC: 33.2 G/DL (ref 32.3–36.5)
MCV RBC AUTO: 91.7 FL (ref 81.4–97.8)
MONOCYTES # BLD AUTO: 0.53 K/UL (ref 0–0.85)
MONOCYTES NFR BLD AUTO: 5.3 % (ref 0–13.4)
NEUTROPHILS # BLD AUTO: 7.2 K/UL (ref 1.82–7.42)
NEUTROPHILS NFR BLD: 71.7 % (ref 44–72)
NRBC # BLD AUTO: 0 K/UL
NRBC BLD-RTO: 0 /100 WBC (ref 0–0.2)
PLATELET # BLD AUTO: 327 K/UL (ref 164–446)
PMV BLD AUTO: 9.1 FL (ref 9–12.9)
POTASSIUM SERPL-SCNC: 4 MMOL/L (ref 3.6–5.5)
PROT SERPL-MCNC: 8.4 G/DL (ref 6–8.2)
PROTHROMBIN TIME: 11.8 SEC (ref 12–14.6)
RBC # BLD AUTO: 5.42 M/UL (ref 4.7–6.1)
SODIUM SERPL-SCNC: 138 MMOL/L (ref 135–145)
TROPONIN T SERPL-MCNC: 10 NG/L (ref 6–19)
WBC # BLD AUTO: 10 K/UL (ref 4.8–10.8)

## 2025-07-09 PROCEDURE — 85730 THROMBOPLASTIN TIME PARTIAL: CPT

## 2025-07-09 PROCEDURE — 96374 THER/PROPH/DIAG INJ IV PUSH: CPT

## 2025-07-09 PROCEDURE — 71045 X-RAY EXAM CHEST 1 VIEW: CPT

## 2025-07-09 PROCEDURE — 96375 TX/PRO/DX INJ NEW DRUG ADDON: CPT

## 2025-07-09 PROCEDURE — 73030 X-RAY EXAM OF SHOULDER: CPT | Mod: LT

## 2025-07-09 PROCEDURE — 36415 COLL VENOUS BLD VENIPUNCTURE: CPT

## 2025-07-09 PROCEDURE — 700111 HCHG RX REV CODE 636 W/ 250 OVERRIDE (IP): Mod: JZ | Performed by: EMERGENCY MEDICINE

## 2025-07-09 PROCEDURE — 85610 PROTHROMBIN TIME: CPT

## 2025-07-09 PROCEDURE — 84484 ASSAY OF TROPONIN QUANT: CPT

## 2025-07-09 PROCEDURE — 93005 ELECTROCARDIOGRAM TRACING: CPT | Mod: TC | Performed by: EMERGENCY MEDICINE

## 2025-07-09 PROCEDURE — 80053 COMPREHEN METABOLIC PANEL: CPT

## 2025-07-09 PROCEDURE — 85025 COMPLETE CBC W/AUTO DIFF WBC: CPT

## 2025-07-09 PROCEDURE — 70450 CT HEAD/BRAIN W/O DYE: CPT

## 2025-07-09 PROCEDURE — 99284 EMERGENCY DEPT VISIT MOD MDM: CPT

## 2025-07-09 RX ORDER — MORPHINE SULFATE 4 MG/ML
4 INJECTION INTRAVENOUS ONCE
Status: COMPLETED | OUTPATIENT
Start: 2025-07-09 | End: 2025-07-09

## 2025-07-09 RX ORDER — LISINOPRIL 10 MG/1
10 TABLET ORAL 2 TIMES DAILY
Qty: 60 TABLET | Refills: 0 | Status: SHIPPED | OUTPATIENT
Start: 2025-07-09 | End: 2025-08-08

## 2025-07-09 RX ORDER — ONDANSETRON 2 MG/ML
4 INJECTION INTRAMUSCULAR; INTRAVENOUS ONCE
Status: COMPLETED | OUTPATIENT
Start: 2025-07-09 | End: 2025-07-09

## 2025-07-09 RX ORDER — HYDROCODONE BITARTRATE AND ACETAMINOPHEN 5; 325 MG/1; MG/1
1 TABLET ORAL EVERY 6 HOURS PRN
Qty: 11 TABLET | Refills: 0 | Status: SHIPPED | OUTPATIENT
Start: 2025-07-09 | End: 2025-07-12

## 2025-07-09 RX ADMIN — MORPHINE SULFATE 4 MG: 4 INJECTION, SOLUTION INTRAMUSCULAR; INTRAVENOUS at 11:35

## 2025-07-09 RX ADMIN — ONDANSETRON 4 MG: 2 INJECTION INTRAMUSCULAR; INTRAVENOUS at 11:35

## 2025-07-09 ASSESSMENT — FIBROSIS 4 INDEX: FIB4 SCORE: 0.86

## 2025-07-09 ASSESSMENT — PAIN DESCRIPTION - PAIN TYPE: TYPE: ACUTE PAIN

## 2025-07-09 NOTE — ED TRIAGE NOTES
Anatoliy Paniagua  54 y.o. male  Chief Complaint   Patient presents with    Shoulder Pain     Left sided shoulder pain for 1x month. Pt states he was throwing heavy items which initially caused the pain.        Vitals:    07/09/25 0908   BP: (!) 169/109   Pulse: 87   Resp: 16   Temp: 36.1 °C (97 °F)   SpO2: 97%       Patient educated on triage process and encouraged to alert staff of any changes in condition.

## 2025-07-09 NOTE — ED PROVIDER NOTES
ED Provider Note    CHIEF COMPLAINT  Chief Complaint   Patient presents with    Shoulder Pain     Left sided shoulder pain for 1x month. Pt states he was throwing heavy items which initially caused the pain.        EXTERNAL RECORDS REVIEWED  Patient was seen in neighboring facility 3 months prior for acute left shoulder pain told he had moderate osteoarthritis in that shoulder without other acute abnormality.    HPI/ROS  LIMITATION TO HISTORY     OUTSIDE HISTORIAN(S):      Anatoliy Paniagua is a 54 y.o. male who presents to the emergency department with chief complaint of left shoulder pain.  Patient states that he was pulling heavy objects a week ago and felt a tearing sensation in his left shoulder is been having moderate pain in that area since then.  He also reports that a few days ago he had a large TV fall and hit him in the head.  He was knocked out during that time.  He did not seek medical care at that time.  He states that he felt very dizzy nauseous and had severe headache since that time.  He reports the pain from his left shoulder also somewhat comes into his chest.  No abdominal pain no lower extremity pain or swelling no shortness of breath no fevers no chills no other acute symptom change or concern at this time.    PAST MEDICAL HISTORY   has a past medical history of Hypertension, Psychiatric disorder, and Stroke (HCC).    SURGICAL HISTORY  patient denies any surgical history    FAMILY HISTORY  History reviewed. No pertinent family history.    SOCIAL HISTORY  Social History     Tobacco Use    Smoking status: Every Day     Current packs/day: 0.25     Types: Cigarettes    Smokeless tobacco: Never    Tobacco comments:     1/2ppd; 20years   Vaping Use    Vaping status: Never Used   Substance and Sexual Activity    Alcohol use: Not Currently     Comment: socially    Drug use: Yes     Types: Inhaled     Comment: I smoke weed    Sexual activity: Not on file       CURRENT MEDICATIONS  Home Medications   "  **Home medications have not yet been reviewed for this encounter**         ALLERGIES  Allergies[1]    PHYSICAL EXAM  VITAL SIGNS: BP (!) 169/109   Pulse 87   Temp 36.1 °C (97 °F) (Temporal)   Resp 16   Ht 1.854 m (6' 1\")   Wt 109 kg (239 lb 3.2 oz)   SpO2 97%   BMI 31.56 kg/m²      Pulse ox interpretation: I interpret this pulse ox as normal.  Constitutional: Somnolent minimal distress, somewhat difficult to arouse  HEENT: Atraumatic normocephalic, pupils are equal round reactive to light extraocular movements are intact. The nares is clear, external ears are normal, mouth shows moist mucous membranes normal dentition for age  Neck: Supple, no JVD no tracheal deviation  Cardiovascular: Regular rate and rhythm no murmur rub or gallop 2+ pulses peripherally x4  Thorax & Lungs: No respiratory distress, no wheezes rales or rhonchi, No chest tenderness.   GI: Soft nontender nondistended positive bowel sounds, no peritoneal signs  Skin: Warm dry no acute rash or lesion  Musculoskeletal: Patient has tenderness about the anterior and lateral deltoid.  He has pain with internal/external rotation as well as abduction of the left shoulder.  No pain at the elbow normal  strength cap refill and sensation left hand.  Other extremities unremarkable.  Neurologic: Cranial nerves III through XII are grossly intact no sensory deficit no cerebellar dysfunction   Psychiatric: Appropriate affect for situation at this time      EKG/LABS  Results for orders placed or performed during the hospital encounter of 07/09/25   CBC WITH DIFFERENTIAL    Collection Time: 07/09/25 11:34 AM   Result Value Ref Range    WBC 10.0 4.8 - 10.8 K/uL    RBC 5.42 4.70 - 6.10 M/uL    Hemoglobin 16.5 14.0 - 18.0 g/dL    Hematocrit 49.7 42.0 - 52.0 %    MCV 91.7 81.4 - 97.8 fL    MCH 30.4 27.0 - 33.0 pg    MCHC 33.2 32.3 - 36.5 g/dL    RDW 41.9 35.9 - 50.0 fL    Platelet Count 327 164 - 446 K/uL    MPV 9.1 9.0 - 12.9 fL    Neutrophils-Polys 71.70 " 44.00 - 72.00 %    Lymphocytes 19.20 (L) 22.00 - 41.00 %    Monocytes 5.30 0.00 - 13.40 %    Eosinophils 2.40 0.00 - 6.90 %    Basophils 0.80 0.00 - 1.80 %    Immature Granulocytes 0.60 0.00 - 0.90 %    Nucleated RBC 0.00 0.00 - 0.20 /100 WBC    Neutrophils (Absolute) 7.20 1.82 - 7.42 K/uL    Lymphs (Absolute) 1.93 1.00 - 4.80 K/uL    Monos (Absolute) 0.53 0.00 - 0.85 K/uL    Eos (Absolute) 0.24 0.00 - 0.51 K/uL    Baso (Absolute) 0.08 0.00 - 0.12 K/uL    Immature Granulocytes (abs) 0.06 0.00 - 0.11 K/uL    NRBC (Absolute) 0.00 K/uL   COMP METABOLIC PANEL    Collection Time: 07/09/25 11:34 AM   Result Value Ref Range    Sodium 138 135 - 145 mmol/L    Potassium 4.0 3.6 - 5.5 mmol/L    Chloride 101 96 - 112 mmol/L    Co2 24 20 - 33 mmol/L    Anion Gap 13.0 7.0 - 16.0    Glucose 103 (H) 65 - 99 mg/dL    Bun 15 8 - 22 mg/dL    Creatinine 1.07 0.50 - 1.40 mg/dL    Calcium 10.0 8.5 - 10.5 mg/dL    Correct Calcium 9.4 8.5 - 10.5 mg/dL    AST(SGOT) 28 12 - 45 U/L    ALT(SGPT) 29 2 - 50 U/L    Alkaline Phosphatase 97 30 - 99 U/L    Total Bilirubin 0.3 0.1 - 1.5 mg/dL    Albumin 4.7 3.2 - 4.9 g/dL    Total Protein 8.4 (H) 6.0 - 8.2 g/dL    Globulin 3.7 (H) 1.9 - 3.5 g/dL    A-G Ratio 1.3 g/dL   TROPONIN    Collection Time: 07/09/25 11:34 AM   Result Value Ref Range    Troponin T 10 6 - 19 ng/L   PRTOTHROMBIN TIME (INR)    Collection Time: 07/09/25 11:34 AM   Result Value Ref Range    PT 11.8 (L) 12.0 - 14.6 sec    INR 0.87 0.87 - 1.13   APTT    Collection Time: 07/09/25 11:34 AM   Result Value Ref Range    APTT 33.4 24.7 - 36.0 sec   ESTIMATED GFR    Collection Time: 07/09/25 11:34 AM   Result Value Ref Range    GFR (CKD-EPI) 81 >60 mL/min/1.73 m 2   EKG    Collection Time: 07/09/25 11:45 AM   Result Value Ref Range    Report       [facility] Emergency Dept.    Test Date:  2025-07-09  Pt Name:    MOHSEN OWENS                Department: ER  MRN:        [id number]                      Room:       TRAUMA - EXAM  1  Gender:     Male                         Technician: [id number]  :        1970                   Requested By:SHENG CONNOLLY JAMSHID  Order #:    [id number]                    Reading MD:    Measurements  Intervals                                Axis  Rate:       70                           P:          9  LA:         209                          QRS:        -84  QRSD:       123                          T:          32  QT:         442  QTc:        477    Interpretive Statements  Sinus rhythm  Borderline prolonged LA interval  RBBB and LAFB  Compared to ECG 2018 14:52:20  Left anterior fascicular block now present  Right bundle-branch block now present  ST (T wave) deviation no longer present  Possible ischemia no longer present        I have independently interpreted this EKG    RADIOLOGY/PROCEDURES     Radiologist interpretation:  CT-HEAD W/O   Final Result      1.  Right posterior parietal scalp hematoma.   2.  No calvarial fracture or acute intracranial hemorrhage.                  DX-CHEST-PORTABLE (1 VIEW)   Final Result      Low lung volumes without definite acute cardiopulmonary abnormality.      DX-SHOULDER 2+ LEFT   Final Result      1.  No acute fracture.   2.  Inferior subluxation of the humeral head with respect to the glenoid fossa.   3.  Degenerative changes of the acromioclavicular and glenohumeral joints.          COURSE & MEDICAL DECISION MAKING    ASSESSMENT, COURSE AND PLAN  Care Narrative: Patient has inferior subluxation of the left glenohumeral joint without acute fracture and minimal arthritis in this area.  Patient has minimal scalp hematoma no other acute intracranial abnormality.  His labs are otherwise reassuring his troponin is negative his EKG is nonischemic his symptoms been present for multiple days no indication for repeat troponin or EKG at this time.  Patient does likely have derangement of his rotator cuff with this inferior subluxation therefore has been placed in a  "sling he is given Norco for pain control he is referred to orthopedics.  Patient is also given instructions for close head injury instructed follow-up with primary care in 1 week for repeat evaluation no strenuous activity during that time.  Return here for worsening headache nausea vomiting confusion altered mental status dizziness worsening left-sided shoulder pain numbness tingling weakness any other acute symptom change or concern otherwise discharged stable and improved condition.      ADDITIONAL PROBLEMS MANAGED    DISPOSITION AND DISCUSSIONS      I have discussed management of the patient with the following physicians and SYD's:      Discussion of management with other QHP or appropriate source(s):      Escalation of care considered, and ultimately not performed:    Barriers to care at this time, including but not limited to: Patient does not have established PCP.     Decision tools and prescription drugs considered including, but not limited to: .  BP (!) 198/107 Comment: ERP aware/pt has not taken BP meds today  Pulse 71   Temp 36.1 °C (97 °F) (Temporal)   Resp 18   Ht 1.854 m (6' 1\")   Wt 109 kg (239 lb 3.2 oz)   SpO2 96%   BMI 31.56 kg/m²     [facility] [address]  [address], [address]  [facility] [address] [phone]  Schedule an appointment as soon as possible for a visit in 1 week  for establishment of primary care, For repeat head injury exam    Anthony Sr M.D.  [address] [facility] [address] [phone]    Schedule an appointment as soon as possible for a visit       [facility], Emergency Dept  [address] [facility] [address] [phone]    in 12-24 hours if symptoms persist, immediately If symptoms worsen, or if you develop any other symptoms or concerns      FINAL DIAGNOSIS  1. Rotator cuff syndrome of left shoulder Active   2. Closed head injury, initial encounter Active   3. Concussion without loss of consciousness, initial " encounter Active   4. Essential hypertension, malignant         Electronically signed by: Baltazar Machado M.D.         [1] No Known Allergies